# Patient Record
Sex: MALE | Race: BLACK OR AFRICAN AMERICAN | NOT HISPANIC OR LATINO | Employment: FULL TIME | URBAN - METROPOLITAN AREA
[De-identification: names, ages, dates, MRNs, and addresses within clinical notes are randomized per-mention and may not be internally consistent; named-entity substitution may affect disease eponyms.]

---

## 2023-04-06 ENCOUNTER — HOSPITAL ENCOUNTER (EMERGENCY)
Facility: HOSPITAL | Age: 33
Discharge: HOME | End: 2023-04-06
Attending: EMERGENCY MEDICINE
Payer: MEDICAID

## 2023-04-06 VITALS
RESPIRATION RATE: 18 BRPM | SYSTOLIC BLOOD PRESSURE: 183 MMHG | TEMPERATURE: 99 F | WEIGHT: 285 LBS | BODY MASS INDEX: 37.77 KG/M2 | HEART RATE: 105 BPM | DIASTOLIC BLOOD PRESSURE: 95 MMHG | OXYGEN SATURATION: 98 % | HEIGHT: 73 IN

## 2023-04-06 DIAGNOSIS — J02.0 STREP PHARYNGITIS: Primary | ICD-10-CM

## 2023-04-06 LAB — GROUP A STREP, MOLECULAR: POSITIVE

## 2023-04-06 PROCEDURE — 87651 STREP A DNA AMP PROBE: CPT | Performed by: PHYSICIAN ASSISTANT

## 2023-04-06 PROCEDURE — 99284 EMERGENCY DEPT VISIT MOD MDM: CPT

## 2023-04-06 PROCEDURE — 96372 THER/PROPH/DIAG INJ SC/IM: CPT | Performed by: PHYSICIAN ASSISTANT

## 2023-04-06 PROCEDURE — 25000003 PHARM REV CODE 250: Performed by: EMERGENCY MEDICINE

## 2023-04-06 PROCEDURE — 63600175 PHARM REV CODE 636 W HCPCS: Performed by: PHYSICIAN ASSISTANT

## 2023-04-06 RX ORDER — AMOXICILLIN 250 MG/1
500 CAPSULE ORAL
Status: COMPLETED | OUTPATIENT
Start: 2023-04-06 | End: 2023-04-06

## 2023-04-06 RX ORDER — DEXAMETHASONE SODIUM PHOSPHATE 4 MG/ML
8 INJECTION, SOLUTION INTRA-ARTICULAR; INTRALESIONAL; INTRAMUSCULAR; INTRAVENOUS; SOFT TISSUE
Status: COMPLETED | OUTPATIENT
Start: 2023-04-06 | End: 2023-04-06

## 2023-04-06 RX ORDER — AMOXICILLIN 500 MG/1
500 CAPSULE ORAL 3 TIMES DAILY
Qty: 29 CAPSULE | Refills: 0 | Status: SHIPPED | OUTPATIENT
Start: 2023-04-06 | End: 2023-04-16

## 2023-04-06 RX ADMIN — DEXAMETHASONE SODIUM PHOSPHATE 8 MG: 4 INJECTION, SOLUTION INTRA-ARTICULAR; INTRALESIONAL; INTRAMUSCULAR; INTRAVENOUS; SOFT TISSUE at 03:04

## 2023-04-06 RX ADMIN — AMOXICILLIN 500 MG: 250 CAPSULE ORAL at 04:04

## 2023-04-06 NOTE — FIRST PROVIDER EVALUATION
Emergency Department TeleTriage Encounter Note      CHIEF COMPLAINT    Chief Complaint   Patient presents with    Sore Throat     Pt c/o sore throat x a few days. Pt denies cough or nasal congestion. Pt reports painful swallowing but is maintaining his saliva.        VITAL SIGNS   Initial Vitals [04/06/23 1446]   BP Pulse Resp Temp SpO2   (!) 183/95 105 18 98.7 °F (37.1 °C) 98 %      MAP       --            ALLERGIES    Review of patient's allergies indicates:  No Known Allergies    PROVIDER TRIAGE NOTE  Patient presents to the ER with complaint of sore throat starting 2 days ago, he reports painful swallowing.  His symptoms worsened today.  He denies fever.    He used sore throat spray.  No other medications today.    Will order rapid strep test and decadron pending ED provider evaluation.    No evidence of acute distress.     Initial orders will be placed and care will be transferred to an alternate provider when patient is roomed for a full evaluation. Any additional orders and the final disposition will be determined by that provider.         ORDERS  Labs Reviewed - No data to display    ED Orders (720h ago, onward)      None              Virtual Visit Note: The provider triage portion of this emergency department evaluation and documentation was performed via [employer], a HIPAA-compliant telemedicine application, in concert with a tele-presenter in the room. A face to face patient evaluation with one of my colleagues will occur once the patient is placed in an emergency department room.      DISCLAIMER: This note was prepared with [employer] voice recognition transcription software. Garbled syntax, mangled pronouns, and other bizarre constructions may be attributed to that software system.

## 2023-04-06 NOTE — ED PROVIDER NOTES
Encounter Date: 4/6/2023       History     Chief Complaint   Patient presents with    Sore Throat     Pt c/o sore throat x a few days. Pt denies cough or nasal congestion. Pt reports painful swallowing but is maintaining his saliva.      The patient is a 33-year-old male who has had a sore throat for the past 3 days with subjective fevers.  He denies any nausea or vomiting, no cough, no shortness of breath in his a 33-year-old male who presents to the emergency department with sore throat for the past 3 days.  He is had fevers, no nausea or vomiting, no cough, no shortness of breath.    Review of patient's allergies indicates:  No Known Allergies  History reviewed. No pertinent past medical history.  History reviewed. No pertinent surgical history.  History reviewed. No pertinent family history.     Review of Systems   Constitutional:  Positive for appetite change, fatigue and fever.   HENT:  Positive for sore throat.    Respiratory:  Negative for shortness of breath.    Cardiovascular:  Negative for chest pain.   Gastrointestinal:  Negative for nausea.   Genitourinary:  Negative for dysuria.   Musculoskeletal:  Negative for back pain.   Skin:  Negative for rash.   Neurological:  Negative for weakness.   Hematological:  Does not bruise/bleed easily.     Physical Exam     Initial Vitals [04/06/23 1446]   BP Pulse Resp Temp SpO2   (!) 183/95 105 18 98.7 °F (37.1 °C) 98 %      MAP       --         Physical Exam    Nursing note and vitals reviewed.  Constitutional: He appears well-developed and well-nourished.   HENT:   Mouth/Throat: Oropharynx is clear and moist.   Eyes: Pupils are equal, round, and reactive to light.   Neck: Neck supple.   Normal range of motion.  Pulmonary/Chest: Breath sounds normal.   Abdominal: Abdomen is soft. There is no abdominal tenderness.   Musculoskeletal:         General: No edema. Normal range of motion.      Cervical back: Normal range of motion and neck supple.     Neurological: He is  alert and oriented to person, place, and time.   Skin: Skin is warm and dry.       ED Course   Procedures  Labs Reviewed   GROUP A STREP, MOLECULAR - Abnormal; Notable for the following components:       Result Value    Group A Strep, Molecular Positive (*)     All other components within normal limits          Imaging Results    None          Medications   amoxicillin capsule 500 mg (has no administration in time range)   dexAMETHasone injection 8 mg (8 mg Intramuscular Given 4/6/23 9348)     Medical Decision Making:   Initial Assessment:   History provided by the patient and his significant other  Differential Diagnosis:   Strep throat, flu, COVID  ED Management:  Karen has strep throat today.  He will be treated with amoxicillin t.i.d. for 10 days.  He was given a Decadron shot in the emergency department, Bicillin is on national back order patient was instructed to take all of his antibiotics until gone or he could have valvular heart disease.                        Clinical Impression:   Final diagnoses:  [J02.0] Strep pharyngitis (Primary)        ED Disposition Condition    Discharge Stable          ED Prescriptions       Medication Sig Dispense Start Date End Date Auth. Provider    amoxicillin (AMOXIL) 500 MG capsule Take 1 capsule (500 mg total) by mouth 3 (three) times daily. for 10 days 29 capsule 4/6/2023 4/16/2023 Raquel Shin MD          Follow-up Information       Follow up With Specialties Details Why Contact Info Additional Information    [facility] Schedule an appointment as soon as possible for a visit  As needed [address], [address]  [facility] [address] [phone] Please park in [address] or [address] and use [employer]. Take Medical Office Bldg. elevators.             Raquel Shin MD  04/06/23 4308

## 2023-04-26 ENCOUNTER — HOSPITAL ENCOUNTER (INPATIENT)
Facility: HOSPITAL | Age: 33
LOS: 2 days | Discharge: HOME | DRG: 639 | End: 2023-04-28
Attending: EMERGENCY MEDICINE | Admitting: INTERNAL MEDICINE
Payer: MEDICAID

## 2023-04-26 DIAGNOSIS — E11.10 DIABETIC KETOACIDOSIS WITHOUT COMA ASSOCIATED WITH TYPE 2 DIABETES MELLITUS: ICD-10-CM

## 2023-04-26 DIAGNOSIS — E11.9 NEW ONSET TYPE 2 DIABETES MELLITUS: ICD-10-CM

## 2023-04-26 DIAGNOSIS — R06.02 SOB (SHORTNESS OF BREATH): ICD-10-CM

## 2023-04-26 DIAGNOSIS — E10.10 DIABETIC KETOACIDOSIS WITHOUT COMA ASSOCIATED WITH TYPE 1 DIABETES MELLITUS: Primary | ICD-10-CM

## 2023-04-26 DIAGNOSIS — D64.9 NORMOCYTIC ANEMIA: ICD-10-CM

## 2023-04-26 DIAGNOSIS — E11.10 DKA (DIABETIC KETOACIDOSIS): ICD-10-CM

## 2023-04-26 LAB
ALBUMIN SERPL BCP-MCNC: 3.6 G/DL (ref 3.5–5.2)
ALLENS TEST: ABNORMAL
ALP SERPL-CCNC: 108 U/L (ref 55–135)
ALT SERPL W/O P-5'-P-CCNC: 14 U/L (ref 10–44)
ANION GAP SERPL CALC-SCNC: 15 MMOL/L (ref 8–16)
ANION GAP SERPL CALC-SCNC: 17 MMOL/L (ref 8–16)
ANION GAP SERPL CALC-SCNC: 17 MMOL/L (ref 8–16)
AST SERPL-CCNC: 12 U/L (ref 10–40)
B-OH-BUTYR BLD STRIP-SCNC: 4.6 MMOL/L (ref 0–0.5)
BASOPHILS # BLD AUTO: 0.04 K/UL (ref 0–0.2)
BASOPHILS NFR BLD: 0.5 % (ref 0–1.9)
BILIRUB SERPL-MCNC: 0.3 MG/DL (ref 0.1–1)
BNP SERPL-MCNC: <10 PG/ML (ref 0–99)
BUN SERPL-MCNC: 11 MG/DL (ref 6–20)
BUN SERPL-MCNC: 11 MG/DL (ref 6–20)
BUN SERPL-MCNC: 14 MG/DL (ref 6–20)
CALCIUM SERPL-MCNC: 8.6 MG/DL (ref 8.7–10.5)
CALCIUM SERPL-MCNC: 8.8 MG/DL (ref 8.7–10.5)
CALCIUM SERPL-MCNC: 8.9 MG/DL (ref 8.7–10.5)
CHLORIDE SERPL-SCNC: 101 MMOL/L (ref 95–110)
CHLORIDE SERPL-SCNC: 108 MMOL/L (ref 95–110)
CHLORIDE SERPL-SCNC: 108 MMOL/L (ref 95–110)
CHOLEST SERPL-MCNC: 150 MG/DL (ref 120–199)
CHOLEST/HDLC SERPL: 3.8 {RATIO} (ref 2–5)
CO2 SERPL-SCNC: 13 MMOL/L (ref 23–29)
CO2 SERPL-SCNC: 14 MMOL/L (ref 23–29)
CO2 SERPL-SCNC: 14 MMOL/L (ref 23–29)
CREAT SERPL-MCNC: 1.2 MG/DL (ref 0.5–1.4)
CREAT SERPL-MCNC: 1.3 MG/DL (ref 0.5–1.4)
CREAT SERPL-MCNC: 1.4 MG/DL (ref 0.5–1.4)
DELSYS: ABNORMAL
DIFFERENTIAL METHOD: ABNORMAL
EOSINOPHIL # BLD AUTO: 0 K/UL (ref 0–0.5)
EOSINOPHIL NFR BLD: 0.2 % (ref 0–8)
ERYTHROCYTE [DISTWIDTH] IN BLOOD BY AUTOMATED COUNT: 12 % (ref 11.5–14.5)
EST. GFR  (NO RACE VARIABLE): >60 ML/MIN/1.73 M^2
ESTIMATED AVG GLUCOSE: 278 MG/DL (ref 68–131)
GLUCOSE SERPL-MCNC: 334 MG/DL (ref 70–110)
GLUCOSE SERPL-MCNC: 355 MG/DL (ref 70–110)
GLUCOSE SERPL-MCNC: 630 MG/DL (ref 70–110)
HBA1C MFR BLD: 11.3 % (ref 4–5.6)
HCO3 UR-SCNC: 19.6 MMOL/L (ref 24–28)
HCT VFR BLD AUTO: 40.8 % (ref 40–54)
HDLC SERPL-MCNC: 39 MG/DL (ref 40–75)
HDLC SERPL: 26 % (ref 20–50)
HETEROPH AB SERPL QL IA: NEGATIVE
HGB BLD-MCNC: 13.6 G/DL (ref 14–18)
IMM GRANULOCYTES # BLD AUTO: 0.02 K/UL (ref 0–0.04)
IMM GRANULOCYTES NFR BLD AUTO: 0.2 % (ref 0–0.5)
LDLC SERPL CALC-MCNC: 82.2 MG/DL (ref 63–159)
LIPASE SERPL-CCNC: 46 U/L (ref 4–60)
LYMPHOCYTES # BLD AUTO: 1.8 K/UL (ref 1–4.8)
LYMPHOCYTES NFR BLD: 20.9 % (ref 18–48)
MCH RBC QN AUTO: 27.8 PG (ref 27–31)
MCHC RBC AUTO-ENTMCNC: 33.3 G/DL (ref 32–36)
MCV RBC AUTO: 83 FL (ref 82–98)
MODE: ABNORMAL
MONOCYTES # BLD AUTO: 0.3 K/UL (ref 0.3–1)
MONOCYTES NFR BLD: 3.7 % (ref 4–15)
NEUTROPHILS # BLD AUTO: 6.5 K/UL (ref 1.8–7.7)
NEUTROPHILS NFR BLD: 74.5 % (ref 38–73)
NONHDLC SERPL-MCNC: 111 MG/DL
NRBC BLD-RTO: 0 /100 WBC
PCO2 BLDA: 37 MMHG (ref 35–45)
PH SMN: 7.33 [PH] (ref 7.35–7.45)
PLATELET # BLD AUTO: 252 K/UL (ref 150–450)
PMV BLD AUTO: 11.2 FL (ref 9.2–12.9)
PO2 BLDA: 41 MMHG (ref 40–60)
POC BE: -6 MMOL/L
POC SATURATED O2: 72 % (ref 95–100)
POC TCO2: 21 MMOL/L (ref 24–29)
POCT GLUCOSE: 259 MG/DL (ref 70–110)
POCT GLUCOSE: 315 MG/DL (ref 70–110)
POCT GLUCOSE: 328 MG/DL (ref 70–110)
POCT GLUCOSE: 379 MG/DL (ref 70–110)
POCT GLUCOSE: 436 MG/DL (ref 70–110)
POTASSIUM SERPL-SCNC: 3.8 MMOL/L (ref 3.5–5.1)
POTASSIUM SERPL-SCNC: 4 MMOL/L (ref 3.5–5.1)
POTASSIUM SERPL-SCNC: 4.3 MMOL/L (ref 3.5–5.1)
PROT SERPL-MCNC: 8.4 G/DL (ref 6–8.4)
RBC # BLD AUTO: 4.9 M/UL (ref 4.6–6.2)
SAMPLE: ABNORMAL
SITE: ABNORMAL
SODIUM SERPL-SCNC: 132 MMOL/L (ref 136–145)
SODIUM SERPL-SCNC: 137 MMOL/L (ref 136–145)
SODIUM SERPL-SCNC: 138 MMOL/L (ref 136–145)
TRIGL SERPL-MCNC: 144 MG/DL (ref 30–150)
TROPONIN I SERPL DL<=0.01 NG/ML-MCNC: <0.006 NG/ML (ref 0–0.03)
TSH SERPL DL<=0.005 MIU/L-ACNC: 2.86 UIU/ML (ref 0.4–4)
WBC # BLD AUTO: 8.68 K/UL (ref 3.9–12.7)

## 2023-04-26 PROCEDURE — 80053 COMPREHEN METABOLIC PANEL: CPT | Performed by: NURSE PRACTITIONER

## 2023-04-26 PROCEDURE — 84443 ASSAY THYROID STIM HORMONE: CPT | Performed by: STUDENT IN AN ORGANIZED HEALTH CARE EDUCATION/TRAINING PROGRAM

## 2023-04-26 PROCEDURE — 99285 EMERGENCY DEPT VISIT HI MDM: CPT | Mod: 25

## 2023-04-26 PROCEDURE — 25000003 PHARM REV CODE 250: Performed by: NURSE PRACTITIONER

## 2023-04-26 PROCEDURE — 20000000 HC ICU ROOM

## 2023-04-26 PROCEDURE — 80048 BASIC METABOLIC PNL TOTAL CA: CPT | Mod: XB | Performed by: STUDENT IN AN ORGANIZED HEALTH CARE EDUCATION/TRAINING PROGRAM

## 2023-04-26 PROCEDURE — 82010 KETONE BODYS QUAN: CPT | Performed by: NURSE PRACTITIONER

## 2023-04-26 PROCEDURE — 86308 HETEROPHILE ANTIBODY SCREEN: CPT | Performed by: NURSE PRACTITIONER

## 2023-04-26 PROCEDURE — 96374 THER/PROPH/DIAG INJ IV PUSH: CPT

## 2023-04-26 PROCEDURE — 96361 HYDRATE IV INFUSION ADD-ON: CPT

## 2023-04-26 PROCEDURE — 63600175 PHARM REV CODE 636 W HCPCS: Performed by: NURSE PRACTITIONER

## 2023-04-26 PROCEDURE — 25000003 PHARM REV CODE 250: Performed by: STUDENT IN AN ORGANIZED HEALTH CARE EDUCATION/TRAINING PROGRAM

## 2023-04-26 PROCEDURE — 84484 ASSAY OF TROPONIN QUANT: CPT | Performed by: NURSE PRACTITIONER

## 2023-04-26 PROCEDURE — 83690 ASSAY OF LIPASE: CPT | Performed by: STUDENT IN AN ORGANIZED HEALTH CARE EDUCATION/TRAINING PROGRAM

## 2023-04-26 PROCEDURE — 83880 ASSAY OF NATRIURETIC PEPTIDE: CPT | Performed by: NURSE PRACTITIONER

## 2023-04-26 PROCEDURE — 63600175 PHARM REV CODE 636 W HCPCS: Performed by: INTERNAL MEDICINE

## 2023-04-26 PROCEDURE — 93005 ELECTROCARDIOGRAM TRACING: CPT

## 2023-04-26 PROCEDURE — 82962 GLUCOSE BLOOD TEST: CPT

## 2023-04-26 PROCEDURE — 85025 COMPLETE CBC W/AUTO DIFF WBC: CPT | Performed by: NURSE PRACTITIONER

## 2023-04-26 PROCEDURE — 80061 LIPID PANEL: CPT | Performed by: STUDENT IN AN ORGANIZED HEALTH CARE EDUCATION/TRAINING PROGRAM

## 2023-04-26 PROCEDURE — 83036 HEMOGLOBIN GLYCOSYLATED A1C: CPT | Performed by: STUDENT IN AN ORGANIZED HEALTH CARE EDUCATION/TRAINING PROGRAM

## 2023-04-26 PROCEDURE — 93010 EKG 12-LEAD: ICD-10-PCS | Mod: ,,, | Performed by: INTERNAL MEDICINE

## 2023-04-26 PROCEDURE — 63600175 PHARM REV CODE 636 W HCPCS: Performed by: STUDENT IN AN ORGANIZED HEALTH CARE EDUCATION/TRAINING PROGRAM

## 2023-04-26 PROCEDURE — 93010 ELECTROCARDIOGRAM REPORT: CPT | Mod: ,,, | Performed by: INTERNAL MEDICINE

## 2023-04-26 RX ORDER — DEXTROSE 40 %
30 GEL (GRAM) ORAL
Status: DISCONTINUED | OUTPATIENT
Start: 2023-04-26 | End: 2023-04-27 | Stop reason: SDUPTHER

## 2023-04-26 RX ORDER — SODIUM CHLORIDE 9 MG/ML
1000 INJECTION, SOLUTION INTRAVENOUS
Status: DISCONTINUED | OUTPATIENT
Start: 2023-04-26 | End: 2023-04-26

## 2023-04-26 RX ORDER — DEXTROSE MONOHYDRATE 100 MG/ML
INJECTION, SOLUTION INTRAVENOUS
Status: DISCONTINUED | OUTPATIENT
Start: 2023-04-26 | End: 2023-04-27

## 2023-04-26 RX ORDER — GLUCAGON 1 MG
1 KIT INJECTION
Status: DISCONTINUED | OUTPATIENT
Start: 2023-04-26 | End: 2023-04-28 | Stop reason: HOSPADM

## 2023-04-26 RX ORDER — TADALAFIL 5 MG/1
5 TABLET ORAL
COMMUNITY
Start: 2022-12-27 | End: 2023-04-26

## 2023-04-26 RX ORDER — NALOXONE HCL 0.4 MG/ML
0.02 VIAL (ML) INJECTION
Status: DISCONTINUED | OUTPATIENT
Start: 2023-04-26 | End: 2023-04-28 | Stop reason: HOSPADM

## 2023-04-26 RX ORDER — SODIUM CHLORIDE 0.9 % (FLUSH) 0.9 %
10 SYRINGE (ML) INJECTION EVERY 12 HOURS PRN
Status: DISCONTINUED | OUTPATIENT
Start: 2023-04-26 | End: 2023-04-28 | Stop reason: HOSPADM

## 2023-04-26 RX ORDER — ACETAMINOPHEN 500 MG
500 TABLET ORAL EVERY 6 HOURS PRN
Status: ON HOLD | COMMUNITY
End: 2023-04-28 | Stop reason: HOSPADM

## 2023-04-26 RX ORDER — ENOXAPARIN SODIUM 100 MG/ML
40 INJECTION SUBCUTANEOUS EVERY 24 HOURS
Status: DISCONTINUED | OUTPATIENT
Start: 2023-04-26 | End: 2023-04-28 | Stop reason: HOSPADM

## 2023-04-26 RX ORDER — DEXTROSE 40 %
15 GEL (GRAM) ORAL
Status: DISCONTINUED | OUTPATIENT
Start: 2023-04-26 | End: 2023-04-27 | Stop reason: SDUPTHER

## 2023-04-26 RX ADMIN — SODIUM CHLORIDE 1000 ML: 0.9 INJECTION, SOLUTION INTRAVENOUS at 05:04

## 2023-04-26 RX ADMIN — SODIUM CHLORIDE 0.1 UNITS/KG/HR: 9 INJECTION, SOLUTION INTRAVENOUS at 09:04

## 2023-04-26 RX ADMIN — SODIUM CHLORIDE 1000 ML: 0.9 INJECTION, SOLUTION INTRAVENOUS at 06:04

## 2023-04-26 RX ADMIN — INSULIN HUMAN 6 UNITS: 100 INJECTION, SOLUTION PARENTERAL at 06:04

## 2023-04-26 RX ADMIN — ENOXAPARIN SODIUM 40 MG: 40 INJECTION SUBCUTANEOUS at 09:04

## 2023-04-26 RX ADMIN — INSULIN DETEMIR 10 UNITS: 100 INJECTION, SOLUTION SUBCUTANEOUS at 10:04

## 2023-04-26 RX ADMIN — POTASSIUM CHLORIDE AND SODIUM CHLORIDE 250 ML/HR: 450; 150 INJECTION, SOLUTION INTRAVENOUS at 10:04

## 2023-04-26 NOTE — Clinical Note
Diagnosis: DKA (diabetic ketoacidosis) [391747]   Admitting Provider:: RAUL HECTOR [[id number]]   Future Attending Provider: RAUL HECTOR [[id number]]   Reason for IP Medical Treatment  (Clinical interventions that can only be accomplished in the IP setting? ) :: DKA   I certify that Inpatient services for greater than or equal to 2 midnights are medically necessary:: Yes   Plans for Post-Acute care--if anticipated (pick the single best option):: A. No post acute care anticipated at this time   Special Needs:: No Special Needs [1]

## 2023-04-26 NOTE — ED TRIAGE NOTES
Review of patient's allergies indicates:  No Known Allergies  Chief Complaint   Patient presents with    Shortness of Breath     Reports sob/fatigue since being diagnosed/treated for strep throat 2 weeks ago.      No past medical history on file.    Patient identifiers verified and correct.     LOC: The patient is awake, alert and aware of environment with an appropriate affect, the patient is oriented x 3 and speaking appropriately.     APPEARANCE: Patient appears comfortable and in no acute distress, patient is clean and well groomed.    HEENT: Head symmetrical. Eyes bilateral.  Bilateral ears without drainage. Bilateral nares patent, throat clear.    SKIN: The skin is warm and dry, color consistent with ethnicity, patient has normal skin turgor and moist mucus membranes, skin intact, no breakdown or bruising noted.     MUSCULOSKELETAL: Patient moving all extremities spontaneously, no swelling noted.    RESPIRATORY: Airway is open and patent, respirations are spontaneous, patient has a normal effort and rate, no accessory muscle use noted.     CARDIAC: Patient has a normal rate and regular rhythm, no edema noted, capillary refill < 3 seconds.     GASTRO: Abdomen soft and non-distended.     NEURO: Pt opens eyes spontaneously pupils equal, round, and reactive. behavior appropriate to situation, follows commands, facial expression symmetrical,    NEUROVASCULAR: All extremities are warm and pink.       Will continue to monitor.

## 2023-04-26 NOTE — LETTER
April 28, 2023         [address] [place] [address] [facility] [facility] [place] [address]  Phone: [phone]  Fax: [phone]       Patient: Jonh Figueroa   YOB: 1990  Date of Visit: 04/28/2023    To Whom It May Concern:    Yeny Figueroa  was at [facility] on 4/26/2023. The patient may return to work/school on 5/2/2023 with no restrictions. If you have any questions or concerns, or if I can be of further assistance, please do not hesitate to contact me.    Sincerely,    Tasia Valenzuela MD

## 2023-04-26 NOTE — ED PROVIDER NOTES
"Encounter Date: 4/26/2023       History     Chief Complaint   Patient presents with    Shortness of Breath     Reports sob/fatigue since being diagnosed/treated for strep throat 2 weeks ago.      33 yr old male presents to the ER with reports of fatigue and sob. Pt states he completed antbx for strep a week ago, started a new job this Monday however missed work this morning due to fatigue. Denies chest pain. No fever, rash, neck stiffness, nausea, vomiting or diarrhea. Pt states "im drinking 3 gallons of water a day with no issues but I still feel like I need more water". Denies hemoptysis. No recent travel. No PMH reported.     The history is provided by the patient. No  was used.   Review of patient's allergies indicates:  No Known Allergies  No past medical history on file.  No past surgical history on file.  No family history on file.     Review of Systems   Constitutional:  Positive for fatigue.   Respiratory:  Positive for shortness of breath.    Endocrine: Positive for polydipsia.   All other systems reviewed and are negative.    Physical Exam     Initial Vitals [04/26/23 1523]   BP Pulse Resp Temp SpO2   137/89 102 20 97.9 °F (36.6 °C) 99 %      MAP       --         Physical Exam    Constitutional: He appears well-developed and well-nourished. He is not diaphoretic. He is Obese . No distress.   HENT:   Head: Normocephalic and atraumatic.   Right Ear: Hearing and tympanic membrane normal.   Left Ear: Hearing and tympanic membrane normal.   Nose: Nose normal.   Mouth/Throat: Uvula is midline, oropharynx is clear and moist and mucous membranes are normal.   Eyes: Lids are normal. Pupils are equal, round, and reactive to light.   Cardiovascular:  Normal rate.           Pulmonary/Chest: Effort normal and breath sounds normal. No respiratory distress. He has no wheezes. He has no rhonchi.   Abdominal: Abdomen is soft. There is no abdominal tenderness.   Musculoskeletal:         General: Normal " range of motion.      Cervical back: No rigidity.     Neurological: He is oriented to person, place, and time.   Skin: Skin is warm and dry. No rash noted.   Psychiatric: He has a normal mood and affect. His behavior is normal. Judgment and thought content normal.       ED Course   Critical Care    Date/Time: 4/26/2023 6:28 PM  Performed by: Marguerite Murillo NP  Authorized by: Maru Schafer MD   Direct patient critical care time: 30 minutes  Total critical care time (exclusive of procedural time) : 30 minutes  Critical care was necessary to treat or prevent imminent or life-threatening deterioration of the following conditions: metabolic crisis.  Critical care was time spent personally by me on the following activities: blood draw for specimens, discussions with consultants, evaluation of patient's response to treatment, examination of patient, obtaining history from patient or surrogate, ordering and performing treatments and interventions, ordering and review of laboratory studies, pulse oximetry, re-evaluation of patient's condition and review of old charts.      Labs Reviewed   CBC W/ AUTO DIFFERENTIAL - Abnormal; Notable for the following components:       Result Value    Hemoglobin 13.6 (*)     Gran % 74.5 (*)     Mono % 3.7 (*)     All other components within normal limits   COMPREHENSIVE METABOLIC PANEL - Abnormal; Notable for the following components:    Sodium 132 (*)     CO2 14 (*)     Glucose 630 (*)     Anion Gap 17 (*)     All other components within normal limits    Narrative:       GLU critical result(s) called and verbal readback obtained from   Nusrat Jack RN by EDGARDO 04/26/2023 17:01   BETA - HYDROXYBUTYRATE, SERUM - Abnormal; Notable for the following components:    Beta-Hydroxybutyrate 4.6 (*)     All other components within normal limits   ISTAT PROCEDURE - Abnormal; Notable for the following components:    POC PH 7.331 (*)     POC HCO3 19.6 (*)     POC SATURATED O2 72 (*)     POC TCO2  21 (*)     All other components within normal limits   POCT GLUCOSE - Abnormal; Notable for the following components:    POCT Glucose 436 (*)     All other components within normal limits   HETEROPHILE AB SCREEN   TROPONIN I   B-TYPE NATRIURETIC PEPTIDE   POCT GLUCOSE MONITORING CONTINUOUS          Imaging Results              X-Ray Chest PA And Lateral (Final result)  Result time 04/26/23 16:33:58      Final result by Lauren Downing MD (04/26/23 16:33:58)                   Impression:      No significant abnormality seen.      Electronically signed by: Lauren Downing MD  Date:    04/26/2023  Time:    16:33               Narrative:    EXAMINATION:  XR CHEST PA AND LATERAL    TECHNIQUE:  PA and lateral views of the chest were performed.    COMPARISON:  None    FINDINGS:  The cardiac silhouette is normal in size, midline.    The pulmonary vascularity is normal.    The pulmonary waqas appear normal bilaterally.    The lungs are well expanded and clear.    No focal airspace disease.    No pleural effusion, no pneumothorax.    The osseous structures appear within normal limits for age.                                       Medications   insulin regular injection 6 Units 0.06 mL (has no administration in time range)   sodium chloride 0.9% bolus 1,000 mL 1,000 mL (has no administration in time range)   sodium chloride 0.9% bolus 1,000 mL 1,000 mL (0 mLs Intravenous Stopped 4/26/23 1822)     Medical Decision Making:   Differential Diagnosis:   Differential Diagnosis includes, but is not limited to:  CVA/TIA, vertigo, anemia/blood loss, cardiogenic shock, arrhythmia, orthostatic hypotension, dehydration, medication side effect, vitamin deficiency, liver disease, hypothyroidism, drug intoxication/withdrawal, metabolic derangement.   Clinical Tests:   Lab Tests: Ordered           ED Course as of 04/26/23 1828 Wed Apr 26, 2023   1649 FINDINGS:  The cardiac silhouette is normal in size, midline.     The pulmonary  vascularity is normal.     The pulmonary waqas appear normal bilaterally.     The lungs are well expanded and clear.     No focal airspace disease.     No pleural effusion, no pneumothorax.     The osseous structures appear within normal limits for age.     Impression:     No significant abnormality seen.    [DT]   1709 Patient's glucose is noted to be 630.  This is a new onset diabetes.  The patient will be given IV fluids and moved to a room for possible admission.  A VBG and beta hydroxy were also added at this time. [DT]   1827 Spoke with [facility] concerning admission.  The patient's glucose did improve with 1 L fluids from 630-436.  The patient will be given IV 6 units of regular insulin in addition to 1 additional bolus of fluids.  The admit team will come down to evaluate the patient prior to starting an IV drip if needed.  He CO2 was noted to be 14 with a gap of 17.  The venous blood gas with a pH of 7.331 was also noted.  A beta hydroxy of 4.6 has been resulted also.  Again this is a first-time diagnoses of diabetes and the patient will be admitted for continued monitoring. [DT]      ED Course User Index  [DT] Marguerite Murillo NP                   Clinical Impression:   Final diagnoses:  [R06.02] SOB (shortness of breath)  [E11.9] New onset type 2 diabetes mellitus (Primary)        ED Disposition Condition    Observation Stable                Marguerite Murillo NP  04/26/23 1828

## 2023-04-27 LAB
ALBUMIN SERPL BCP-MCNC: 2.9 G/DL (ref 3.5–5.2)
ALP SERPL-CCNC: 70 U/L (ref 55–135)
ALT SERPL W/O P-5'-P-CCNC: 11 U/L (ref 10–44)
ANION GAP SERPL CALC-SCNC: 10 MMOL/L (ref 8–16)
ANION GAP SERPL CALC-SCNC: 11 MMOL/L (ref 8–16)
ANION GAP SERPL CALC-SCNC: 13 MMOL/L (ref 8–16)
ANION GAP SERPL CALC-SCNC: 9 MMOL/L (ref 8–16)
ANION GAP SERPL CALC-SCNC: NORMAL MMOL/L (ref 8–16)
AST SERPL-CCNC: 10 U/L (ref 10–40)
BASOPHILS # BLD AUTO: 0.03 K/UL (ref 0–0.2)
BASOPHILS NFR BLD: 0.3 % (ref 0–1.9)
BILIRUB SERPL-MCNC: 0.5 MG/DL (ref 0.1–1)
BUN SERPL-MCNC: 10 MG/DL (ref 6–20)
BUN SERPL-MCNC: 8 MG/DL (ref 6–20)
BUN SERPL-MCNC: 8 MG/DL (ref 6–20)
BUN SERPL-MCNC: 9 MG/DL (ref 6–20)
BUN SERPL-MCNC: NORMAL MG/DL (ref 6–20)
CALCIUM SERPL-MCNC: 8.3 MG/DL (ref 8.7–10.5)
CALCIUM SERPL-MCNC: 8.6 MG/DL (ref 8.7–10.5)
CALCIUM SERPL-MCNC: 8.7 MG/DL (ref 8.7–10.5)
CALCIUM SERPL-MCNC: 8.8 MG/DL (ref 8.7–10.5)
CALCIUM SERPL-MCNC: 8.9 MG/DL (ref 8.7–10.5)
CALCIUM SERPL-MCNC: NORMAL MG/DL (ref 8.7–10.5)
CHLORIDE SERPL-SCNC: 106 MMOL/L (ref 95–110)
CHLORIDE SERPL-SCNC: 107 MMOL/L (ref 95–110)
CHLORIDE SERPL-SCNC: 110 MMOL/L (ref 95–110)
CHLORIDE SERPL-SCNC: 114 MMOL/L (ref 95–110)
CHLORIDE SERPL-SCNC: NORMAL MMOL/L (ref 95–110)
CO2 SERPL-SCNC: 17 MMOL/L (ref 23–29)
CO2 SERPL-SCNC: 18 MMOL/L (ref 23–29)
CO2 SERPL-SCNC: 18 MMOL/L (ref 23–29)
CO2 SERPL-SCNC: NORMAL MMOL/L (ref 23–29)
CREAT SERPL-MCNC: 1 MG/DL (ref 0.5–1.4)
CREAT SERPL-MCNC: 1.1 MG/DL (ref 0.5–1.4)
CREAT SERPL-MCNC: 1.1 MG/DL (ref 0.5–1.4)
CREAT SERPL-MCNC: NORMAL MG/DL (ref 0.5–1.4)
DIFFERENTIAL METHOD: ABNORMAL
EOSINOPHIL # BLD AUTO: 0 K/UL (ref 0–0.5)
EOSINOPHIL NFR BLD: 0.2 % (ref 0–8)
ERYTHROCYTE [DISTWIDTH] IN BLOOD BY AUTOMATED COUNT: 12 % (ref 11.5–14.5)
EST. GFR  (AFRICAN AMERICAN): NORMAL ML/MIN/1.73 M^2
EST. GFR  (NO RACE VARIABLE): >60 ML/MIN/1.73 M^2
EST. GFR  (NO RACE VARIABLE): NORMAL ML/MIN/1.73 M^2
EST. GFR  (NON AFRICAN AMERICAN): NORMAL ML/MIN/1.73 M^2
GLUCOSE SERPL-MCNC: 111 MG/DL (ref 70–110)
GLUCOSE SERPL-MCNC: 272 MG/DL (ref 70–110)
GLUCOSE SERPL-MCNC: 323 MG/DL (ref 70–110)
GLUCOSE SERPL-MCNC: 344 MG/DL (ref 70–110)
GLUCOSE SERPL-MCNC: 67 MG/DL (ref 70–110)
GLUCOSE SERPL-MCNC: NORMAL MG/DL (ref 70–110)
HCT VFR BLD AUTO: 34.1 % (ref 40–54)
HGB BLD-MCNC: 11.3 G/DL (ref 14–18)
IMM GRANULOCYTES # BLD AUTO: 0.03 K/UL (ref 0–0.04)
IMM GRANULOCYTES NFR BLD AUTO: 0.3 % (ref 0–0.5)
LYMPHOCYTES # BLD AUTO: 2.4 K/UL (ref 1–4.8)
LYMPHOCYTES NFR BLD: 24.4 % (ref 18–48)
MAGNESIUM SERPL-MCNC: 2 MG/DL (ref 1.6–2.6)
MCH RBC QN AUTO: 27.7 PG (ref 27–31)
MCHC RBC AUTO-ENTMCNC: 33.1 G/DL (ref 32–36)
MCV RBC AUTO: 84 FL (ref 82–98)
MONOCYTES # BLD AUTO: 0.7 K/UL (ref 0.3–1)
MONOCYTES NFR BLD: 7 % (ref 4–15)
NEUTROPHILS # BLD AUTO: 6.8 K/UL (ref 1.8–7.7)
NEUTROPHILS NFR BLD: 67.8 % (ref 38–73)
NRBC BLD-RTO: 0 /100 WBC
PHOSPHATE SERPL-MCNC: 3.6 MG/DL (ref 2.7–4.5)
PLATELET # BLD AUTO: 217 K/UL (ref 150–450)
PMV BLD AUTO: 11.3 FL (ref 9.2–12.9)
POCT GLUCOSE: 131 MG/DL (ref 70–110)
POCT GLUCOSE: 256 MG/DL (ref 70–110)
POCT GLUCOSE: 280 MG/DL (ref 70–110)
POCT GLUCOSE: 297 MG/DL (ref 70–110)
POCT GLUCOSE: 324 MG/DL (ref 70–110)
POCT GLUCOSE: 327 MG/DL (ref 70–110)
POCT GLUCOSE: 332 MG/DL (ref 70–110)
POCT GLUCOSE: 366 MG/DL (ref 70–110)
POCT GLUCOSE: 465 MG/DL (ref 70–110)
POCT GLUCOSE: 61 MG/DL (ref 70–110)
POTASSIUM SERPL-SCNC: 3.1 MMOL/L (ref 3.5–5.1)
POTASSIUM SERPL-SCNC: 3.2 MMOL/L (ref 3.5–5.1)
POTASSIUM SERPL-SCNC: 3.7 MMOL/L (ref 3.5–5.1)
POTASSIUM SERPL-SCNC: 3.8 MMOL/L (ref 3.5–5.1)
POTASSIUM SERPL-SCNC: 3.9 MMOL/L (ref 3.5–5.1)
POTASSIUM SERPL-SCNC: NORMAL MMOL/L (ref 3.5–5.1)
PROT SERPL-MCNC: 6.8 G/DL (ref 6–8.4)
RBC # BLD AUTO: 4.08 M/UL (ref 4.6–6.2)
SODIUM SERPL-SCNC: 134 MMOL/L (ref 136–145)
SODIUM SERPL-SCNC: 135 MMOL/L (ref 136–145)
SODIUM SERPL-SCNC: 140 MMOL/L (ref 136–145)
SODIUM SERPL-SCNC: 141 MMOL/L (ref 136–145)
SODIUM SERPL-SCNC: NORMAL MMOL/L (ref 136–145)
WBC # BLD AUTO: 10.02 K/UL (ref 3.9–12.7)

## 2023-04-27 PROCEDURE — 80048 BASIC METABOLIC PNL TOTAL CA: CPT | Mod: XB | Performed by: INTERNAL MEDICINE

## 2023-04-27 PROCEDURE — 86337 INSULIN ANTIBODIES: CPT | Performed by: STUDENT IN AN ORGANIZED HEALTH CARE EDUCATION/TRAINING PROGRAM

## 2023-04-27 PROCEDURE — 25000003 PHARM REV CODE 250: Performed by: STUDENT IN AN ORGANIZED HEALTH CARE EDUCATION/TRAINING PROGRAM

## 2023-04-27 PROCEDURE — 63600175 PHARM REV CODE 636 W HCPCS: Performed by: STUDENT IN AN ORGANIZED HEALTH CARE EDUCATION/TRAINING PROGRAM

## 2023-04-27 PROCEDURE — 80053 COMPREHEN METABOLIC PANEL: CPT | Performed by: STUDENT IN AN ORGANIZED HEALTH CARE EDUCATION/TRAINING PROGRAM

## 2023-04-27 PROCEDURE — 94761 N-INVAS EAR/PLS OXIMETRY MLT: CPT

## 2023-04-27 PROCEDURE — 36415 COLL VENOUS BLD VENIPUNCTURE: CPT | Performed by: INTERNAL MEDICINE

## 2023-04-27 PROCEDURE — 85025 COMPLETE CBC W/AUTO DIFF WBC: CPT | Performed by: STUDENT IN AN ORGANIZED HEALTH CARE EDUCATION/TRAINING PROGRAM

## 2023-04-27 PROCEDURE — 86341 ISLET CELL ANTIBODY: CPT | Performed by: STUDENT IN AN ORGANIZED HEALTH CARE EDUCATION/TRAINING PROGRAM

## 2023-04-27 PROCEDURE — 25000003 PHARM REV CODE 250: Performed by: INTERNAL MEDICINE

## 2023-04-27 PROCEDURE — 63600175 PHARM REV CODE 636 W HCPCS: Performed by: INTERNAL MEDICINE

## 2023-04-27 PROCEDURE — 11000001 HC ACUTE MED/SURG PRIVATE ROOM

## 2023-04-27 PROCEDURE — 36415 COLL VENOUS BLD VENIPUNCTURE: CPT | Performed by: STUDENT IN AN ORGANIZED HEALTH CARE EDUCATION/TRAINING PROGRAM

## 2023-04-27 PROCEDURE — 80048 BASIC METABOLIC PNL TOTAL CA: CPT | Mod: 91,XB | Performed by: STUDENT IN AN ORGANIZED HEALTH CARE EDUCATION/TRAINING PROGRAM

## 2023-04-27 PROCEDURE — 84100 ASSAY OF PHOSPHORUS: CPT | Performed by: STUDENT IN AN ORGANIZED HEALTH CARE EDUCATION/TRAINING PROGRAM

## 2023-04-27 PROCEDURE — 83735 ASSAY OF MAGNESIUM: CPT | Performed by: STUDENT IN AN ORGANIZED HEALTH CARE EDUCATION/TRAINING PROGRAM

## 2023-04-27 RX ORDER — INSULIN ASPART 100 [IU]/ML
1-10 INJECTION, SOLUTION INTRAVENOUS; SUBCUTANEOUS
Status: DISCONTINUED | OUTPATIENT
Start: 2023-04-27 | End: 2023-04-28 | Stop reason: HOSPADM

## 2023-04-27 RX ORDER — MUPIROCIN 20 MG/G
OINTMENT TOPICAL 2 TIMES DAILY
Status: DISCONTINUED | OUTPATIENT
Start: 2023-04-27 | End: 2023-04-28 | Stop reason: HOSPADM

## 2023-04-27 RX ORDER — MAGNESIUM SULFATE HEPTAHYDRATE 40 MG/ML
2 INJECTION, SOLUTION INTRAVENOUS ONCE
Status: COMPLETED | OUTPATIENT
Start: 2023-04-27 | End: 2023-04-27

## 2023-04-27 RX ORDER — DEXTROSE 40 %
30 GEL (GRAM) ORAL
Status: DISCONTINUED | OUTPATIENT
Start: 2023-04-27 | End: 2023-04-28 | Stop reason: HOSPADM

## 2023-04-27 RX ORDER — GLUCAGON 1 MG
1 KIT INJECTION
Status: DISCONTINUED | OUTPATIENT
Start: 2023-04-27 | End: 2023-04-28 | Stop reason: HOSPADM

## 2023-04-27 RX ORDER — DEXTROSE 40 %
15 GEL (GRAM) ORAL
Status: DISCONTINUED | OUTPATIENT
Start: 2023-04-27 | End: 2023-04-28 | Stop reason: HOSPADM

## 2023-04-27 RX ORDER — DEXTROSE MONOHYDRATE, SODIUM CHLORIDE, AND POTASSIUM CHLORIDE 50; 2.98; 4.5 G/1000ML; G/1000ML; G/1000ML
INJECTION, SOLUTION INTRAVENOUS CONTINUOUS
Status: ACTIVE | OUTPATIENT
Start: 2023-04-27 | End: 2023-04-27

## 2023-04-27 RX ORDER — POTASSIUM CHLORIDE 20 MEQ/1
40 TABLET, EXTENDED RELEASE ORAL ONCE
Status: COMPLETED | OUTPATIENT
Start: 2023-04-27 | End: 2023-04-27

## 2023-04-27 RX ORDER — DEXTROSE MONOHYDRATE, SODIUM CHLORIDE, AND POTASSIUM CHLORIDE 50; 2.98; 4.5 G/1000ML; G/1000ML; G/1000ML
INJECTION, SOLUTION INTRAVENOUS CONTINUOUS
Status: DISCONTINUED | OUTPATIENT
Start: 2023-04-27 | End: 2023-04-27

## 2023-04-27 RX ADMIN — INSULIN DETEMIR 10 UNITS: 100 INJECTION, SOLUTION SUBCUTANEOUS at 08:04

## 2023-04-27 RX ADMIN — MUPIROCIN: 20 OINTMENT TOPICAL at 08:04

## 2023-04-27 RX ADMIN — INSULIN ASPART 8 UNITS: 100 INJECTION, SOLUTION INTRAVENOUS; SUBCUTANEOUS at 04:04

## 2023-04-27 RX ADMIN — INSULIN ASPART 8 UNITS: 100 INJECTION, SOLUTION INTRAVENOUS; SUBCUTANEOUS at 05:04

## 2023-04-27 RX ADMIN — DEXTROSE MONOHYDRATE, SODIUM CHLORIDE, AND POTASSIUM CHLORIDE: 50; 4.5; 2.98 INJECTION, SOLUTION INTRAVENOUS at 02:04

## 2023-04-27 RX ADMIN — INSULIN ASPART 6 UNITS: 100 INJECTION, SOLUTION INTRAVENOUS; SUBCUTANEOUS at 09:04

## 2023-04-27 RX ADMIN — MUPIROCIN: 20 OINTMENT TOPICAL at 09:04

## 2023-04-27 RX ADMIN — MAGNESIUM SULFATE 2 G: 2 INJECTION INTRAVENOUS at 03:04

## 2023-04-27 RX ADMIN — POTASSIUM CHLORIDE 40 MEQ: 1500 TABLET, EXTENDED RELEASE ORAL at 03:04

## 2023-04-27 RX ADMIN — INSULIN ASPART 5 UNITS: 100 INJECTION, SOLUTION INTRAVENOUS; SUBCUTANEOUS at 08:04

## 2023-04-27 RX ADMIN — DEXTROSE MONOHYDRATE 125 ML: 100 INJECTION, SOLUTION INTRAVENOUS at 02:04

## 2023-04-27 NOTE — PROGRESS NOTES
" [facility] Medicine Progress Note    Primary Team: hospitals Hospitalist Team B  Attending Physician: Alvaro Anthony MD  Resident: Brian  Intern: [facility] Day: 1 days    Chief Complaint: Weakness    Subjective:      Patient seen and examined by me this morning.  No focal complaints, states he is hungry and ready for breakfast. Denies any SOB, chestpain, nausea, or vomiting.    Review of Systems   Constitutional:  Negative for chills and fever.   Eyes:  Negative for blurred vision.   Respiratory:  Negative for cough.    Cardiovascular:  Negative for chest pain.   Gastrointestinal:  Negative for nausea and vomiting.   Genitourinary:  Negative for dysuria.   Neurological:  Negative for dizziness and headaches.       Objective:   Last 24 Hour Vital Signs:  BP  Min: 114/68  Max: 172/88  Temp  Av °F (36.7 °C)  Min: 97.9 °F (36.6 °C)  Max: 98 °F (36.7 °C)  Pulse  Av.5  Min: 84  Max: 102  Resp  Av  Min: 4  Max: 37  SpO2  Av.8 %  Min: 97 %  Max: 100 %  Height  Av' 1" (185.4 cm)  Min: 6' 1" (185.4 cm)  Max: 6' 1" (185.4 cm)  Weight  Av kg (260 lb 1.1 oz)  Min: 117.9 kg (260 lb)  Max: 118 kg (260 lb 2.3 oz)    Intake/Output Summary (Last 24 hours) at 2023 0818  Last data filed at 2023 0337  Gross per 24 hour   Intake 2718.17 ml   Output --   Net 2718.17 ml      Physical Examination:  Physical Exam  Constitutional:       Appearance: He is normal weight.   HENT:      Head: Normocephalic and atraumatic.      Right Ear: External ear normal.      Left Ear: External ear normal.      Nose: Nose normal.      Mouth/Throat:      Mouth: Mucous membranes are moist.   Eyes:      Extraocular Movements: Extraocular movements intact.      Conjunctiva/sclera: Conjunctivae normal.   Cardiovascular:      Rate and Rhythm: Normal rate and regular rhythm.      Heart sounds: No murmur heard.  Pulmonary:      Effort: Pulmonary effort is normal.      Breath sounds: Normal breath sounds. No stridor. "   Abdominal:      General: Abdomen is flat. Bowel sounds are normal.      Palpations: Abdomen is soft.   Musculoskeletal:         General: Normal range of motion.   Skin:     General: Skin is warm and dry.      Coloration: Skin is not jaundiced.   Neurological:      Mental Status: He is alert and oriented to person, place, and time. Mental status is at baseline.   Psychiatric:         Mood and Affect: Mood normal.      Laboratory:  Recent Labs   Lab 04/26/23  1611 04/26/23  2019 04/27/23  0048 04/27/23  0256 04/27/23  0439   WBC 8.68  --   --   --  10.02   HGB 13.6*  --   --   --  11.3*   HCT 40.8  --   --   --  34.1*     --   --   --  217   MCV 83  --   --   --  84   RDW 12.0  --   --   --  12.0   *   < > 141 140 135*  135*  135*   K 4.3   < > 3.2* 3.1* 3.7  3.7  3.7      < > 114* 110 107  107  107   CO2 14*   < > 18* 17* 17*  17*  17*   BUN 14   < > 10 9 9  9  9   CREATININE 1.4   < > 1.0 1.0 1.0  1.0  1.0   *   < > 111* 67* 323*  323*  323*   PROT 8.4  --   --   --  6.8   ALBUMIN 3.6  --   --   --  2.9*   BILITOT 0.3  --   --   --  0.5   AST 12  --   --   --  10   ALKPHOS 108  --   --   --  70   ALT 14  --   --   --  11    < > = values in this interval not displayed.       Microbiology Results (last 7 days)       ** No results found for the last 168 hours. **             I have personally reviewed the above laboratory studies.     Imaging:  X-Ray Chest PA And Lateral    Result Date: 4/26/2023  EXAMINATION: XR CHEST PA AND LATERAL TECHNIQUE: PA and lateral views of the chest were performed. COMPARISON: None FINDINGS: The cardiac silhouette is normal in size, midline. The pulmonary vascularity is normal. The pulmonary waqas appear normal bilaterally. The lungs are well expanded and clear. No focal airspace disease. No pleural effusion, no pneumothorax. The osseous structures appear within normal limits for age.     No significant abnormality seen. Electronically signed  by: Lauren Downing MD Date:    04/26/2023 Time:    16:33     Current Medications:     Scheduled:   enoxaparin  40 mg Subcutaneous Daily    insulin detemir U-100  10 Units Subcutaneous QHS    mupirocin   Nasal BID         Infusions:       PRN:  dextrose 10%, dextrose 10%, dextrose, dextrose, glucagon (human recombinant), glucagon (human recombinant), insulin aspart U-100, naloxone, sodium chloride 0.9%  Antibiotics and Day Number of Therapy:  Antibiotics (From admission, onward)      Start     Stop Route Frequency Ordered    04/27/23 0900  mupirocin 2 % ointment         05/02 0859 Nasl 2 times daily 04/27/23 0244                Assessment:     Jonh Figueroa is a 33 y.o. male with no past medical history who presents with DKA    Plan:     DKA, resolved  Newly Diagnosed Diabetes.  -GAP closed, patient eating, off insulin drip since this morning  -Continue Glucose checks, SSI, and 10U Detemir QN  -Plan to monitor morning glucose with detemir to determine discharge dose.  -Consulted diabetes education and dietician  -A1c 11.3, insulin and GADA antibodies ordered to evaluate for T1DM     Health Care Maintenance  - Vaccinations needed: covid, flu, tdap, prevnar 20     Ppx: lovenox  Diet: Diabetic  Code: FULL    Dispo: Transfer to floor    Brayan Torers MD  [facility] Internal Medicine PGY-2    [facility] Medicine Hospitalist Pager numbers:   [facility] Hospitalist Medicine Team A (Donna/Cordell): [phone]  [facility] Hospitalist Medicine Team B (Tiana/Gabrielle):  [phone]

## 2023-04-27 NOTE — PLAN OF CARE
Pt started on diabetic teaching, let him draw up and give insulin, do finger stick, reading material given

## 2023-04-27 NOTE — PLAN OF CARE
Pt off fluids and insulin drip, pt has no co except sore throat, had been recently treated for strept throat, can not see any redness, using choraseptic spray, started some diabetic instructions, will speak with  re getting glucometer, and getting outpatient, diabetic instructions

## 2023-04-27 NOTE — PROGRESS NOTES
The sw was just notified by Harleen with [facility] that the pt has been approved for Medicaid. The sw notified the pt of this info.

## 2023-04-27 NOTE — CONSULTS
Food & Nutrition  Education    Diet Education: Diabetes  Time Spent: N/A  Learners: Pt      Nutrition Education provided with handouts: (Attached to d/c Papers)  Carbohydrate Counting  Diabetes and Diet    Comments:  Pt was having ultrasound at times of attempted visits. Attached handouts to pt's d/c paperwork. Will follow up Monday for possible eduction.      All questions and concerns answered. Dietitian's contact information provided.       Follow-Up: 5/1/2023    Please Re-consult as needed        Thanks!

## 2023-04-27 NOTE — PLAN OF CARE
Pt 's mom at bedside, informed her of son's status, she is on insulin, so she will be able to help him with the insulin, told her dietician to see him, she reviewed the information pamphlets I gave him, also told her and pt , that since now on medicaid, he should go to a diabetes class at the [facility], it would highly benefit him, numbers on the info given    Report to fanny HAMMER

## 2023-04-27 NOTE — EICU
e-ICU admit note            Reason for ICU admission:    DKA      HPI:    32 yo male with lightheadedness.     3 weeks ago was tx for  strep throat.    Now found to have glucose 630, AG 17, beta hydroxy 4.6    Admitted for DKA    Not known as diabetic    In ED received 1 L NS and 6 u regular insulin                         Significant images    CXR normal    ECG:    NSR, q inferior                        I viewed the pt via camera at    10:42 pm:    99 sinus, 100%, 150/90, R 20     Talking on phone     NAD                                         Assessment/Plan:    DKA  Protocol for insulin, lytes, fluids    Recent strep throat  treated                 DVT ppx  enoxaparin

## 2023-04-27 NOTE — H&P
[facility] Medicine H&P Note     Admitting Team: [facility] Hospitalist Team B  Attending Physician: Alvaro Anthony MD  Resident: Brian  Intern: Aleida    Date of Admit: 4/26/2023    Chief Complaint     Lightheadedness for 2 days     Subjective:      History of Present Illness:  Jonh Figueroa is a 33 y.o. male with no previous PMHx. The patient presented on 4/26/2023 for evaluation of lightheadedness.    Patient was in his usual state of health until 3 weeks ago when he developed strep through. He saw a doctor who gave him an IM shot of antibitotcs and approximately 5 days of oral antibiotic. He reports completing the antibiotics. He reports that for the past few days he has felt lightheaded. He has also been more tired and having frequent urination. He also reports a sore throat. He reports feeling a little exhausted when walking up the stairs. He is tolerating PO.     In the ED, the patient received 1L NaCl and insulin regular 6 U. [facility] IM was consulted for the evaluation and management of DKA.    Past Medical History:  None    Past Surgical History:  None    Allergies:  Review of patient's allergies indicates:  No Known Allergies    Home Medications:  Prior to Admission medications    Medication Sig Start Date End Date Taking? Authorizing Provider   acetaminophen (TYLENOL) 500 MG tablet Take 500 mg by mouth every 6 (six) hours as needed for Pain.   Yes Historical Provider   tadalafiL (CIALIS) 5 MG tablet Take 5 mg by mouth. 12/27/22 4/26/23  Historical Provider     Family History:  Mother- diabetes  Father- HTN    Social History:   Denies tobacco use  Occasional alcohol use  Denies IVDU    Review of Systems:  Review of Systems   Constitutional:  Negative for chills and fever.   Eyes:  Positive for blurred vision.   Respiratory:  Positive for shortness of breath.    Cardiovascular:  Negative for chest pain.   Gastrointestinal:  Negative for constipation, diarrhea, nausea and vomiting.   Genitourinary:  Positive for  frequency. Negative for dysuria.   Musculoskeletal:  Positive for back pain.   Neurological:  Negative for headaches.     Health Care Maintenance :   Primary Care Physician: Primary Doctor No   Immunizations:   1 dose of COVID vaccine    Cancer Screening:  Colonoscopy: not indicated    Objective:   Last 24 Hour Vital Signs:  BP  Min: 136/87  Max: 145/98  Temp  Av.9 °F (36.6 °C)  Min: 97.9 °F (36.6 °C)  Max: 97.9 °F (36.6 °C)  Pulse  Av.3  Min: 86  Max: 102  Resp  Av.8  Min: 17  Max: 20  SpO2  Av.4 %  Min: 99 %  Max: 100 %  Weight  Av.9 kg (260 lb)  Min: 117.9 kg (260 lb)  Max: 117.9 kg (260 lb)  Body mass index is 34.3 kg/m².  No intake/output data recorded.  Physical Examination:  Physical Exam   Constitutional: normal appearance. No distress.   HENT:   Head: Normocephalic.   Mouth/Throat: Mucous membranes are moist. Oropharynx is clear.   Eyes: Pupils are equal, round, and reactive to light.   Cardiovascular: Normal rate, regular rhythm, normal heart sounds and normal pulses. Pulmonary:      Effort: Pulmonary effort is normal.      Breath sounds: Normal breath sounds.     Abdominal: Soft. Normal appearance. There is no abdominal tenderness.   Neurological: He is alert.   Skin: Skin is warm and dry.   Psychiatric: His behavior is normal. Mood normal.    Laboratory:  Most Recent Data:  CBC:   Lab Results   Component Value Date    WBC 8.68 2023    HGB 13.6 (L) 2023    HCT 40.8 2023     2023    MCV 83 2023    RDW 12.0 2023       BMP:   Lab Results   Component Value Date     (L) 2023    K 4.3 2023     2023    CO2 14 (L) 2023    BUN 14 2023    CREATININE 1.4 2023     (HH) 2023    CALCIUM 8.9 2023     LFTs:   Lab Results   Component Value Date    PROT 8.4 2023    ALBUMIN 3.6 2023    BILITOT 0.3 2023    AST 12 2023    ALKPHOS 108 2023    ALT 14 2023      Coags: No results found for: INR, PROTIME, PTT  FLP: No results found for: CHOL, HDL, LDLCALC, TRIG, CHOLHDL  DM:   Lab Results   Component Value Date    CREATININE 1.4 04/26/2023     Thyroid: No results found for: TSH, FREET4, Q4BLUZQ, H9ZYSOX, THYROIDAB  Anemia: No results found for: IRON, TIBC, FERRITIN, QFQTKGPM98, FOLATE  Cardiac:   Lab Results   Component Value Date    TROPONINI <0.006 04/26/2023    BNP <10 04/26/2023     Urinalysis: No results found for: LABURIN, COLORU, PHUA, CLARITYU, SPECGRAV, LABSPEC, NITRITE, PROTEINUR, GLUCOSEU, KETONESU, UROBILINOGEN, BILIRUBINUR, BLOODU, RBCU, WBCUA  Trended Lab Data:  Recent Labs   Lab 04/26/23  1611   WBC 8.68   HGB 13.6*   HCT 40.8      MCV 83   RDW 12.0   *   K 4.3      CO2 14*   BUN 14   CREATININE 1.4   *   PROT 8.4   ALBUMIN 3.6   BILITOT 0.3   AST 12   ALKPHOS 108   ALT 14     Trended Cardiac Data:  Recent Labs   Lab 04/26/23  1611   TROPONINI <0.006   BNP <10     Microbiology Data:  None    Other Results:  EKG (my interpretation): sinus rhythm with sinus arrythmia     Radiology:  X-Ray Chest PA And Lateral    Result Date: 4/26/2023  EXAMINATION: XR CHEST PA AND LATERAL TECHNIQUE: PA and lateral views of the chest were performed. COMPARISON: None FINDINGS: The cardiac silhouette is normal in size, midline. The pulmonary vascularity is normal. The pulmonary waqas appear normal bilaterally. The lungs are well expanded and clear. No focal airspace disease. No pleural effusion, no pneumothorax. The osseous structures appear within normal limits for age.     No significant abnormality seen. Electronically signed by: Lauren Downing MD Date:    04/26/2023 Time:    16:33       Assessment:     Jonh Figueroa is a 33 y.o. male with no previous PMHx presenting with lightheadedness. Admitted for DKA.      Plan:   DKA  -Glucose to 630 at admit  -Bicarb 14, AG 17  -Mildly acidotic 7.33  -Gave 10 U detemir, started 1/2NS with 20 mEq K at  250/hr  -Insulin drip started  -Q2 BMPs, nursing to adjust fluids per DKA order set    Health Care Maintenance  - Vaccinations needed: covid, flu, tdap, prevnar 20    Ppx: lovenox  Diet: Diabetic  Code: FULL    Disposition: pending resolution/improvement of DKA    La Shin M.D.  [facility] Internal Medicine PGY-1    [facility] Medicine Hospitalist Pager numbers:   [facility] Hospitalist Medicine Team A (Donna/Cordell): [phone]  [facility] Hospitalist Medicine Team B (Tiana/Gabrielle):  [phone]

## 2023-04-27 NOTE — PLAN OF CARE
Pt transferred to 453 via wheelchair, family with him, meds, chart, and belongings with him, his phone and  with him, no co  fanny at bedside

## 2023-04-27 NOTE — PROGRESS NOTES
VIRTUAL NURSE:  Cued into patient's room.  Permission received per patient to turn camera to view patient.  Introduced as VN that will be working with floor nurse and nursing assistant.  Educated patient on VN's role in patient care and  VIP model.  Plan of care reviewed with patient.  Education per flowsheet.   Informed patient that staff will round on them every 2 hours but to use call light for any other needs they may have; informed of fall risk and fall precautions.  Patient verbalized understanding.  Call light within reach; bed siderails up x3.  Opportunity given for questions and questions answered.  Admission assessment questions answered.  Patient denies complaints or any needs at this time. Instructed to call for assistance.  Will cont to monitor and intervene as needed.    Labs, notes, orders, and careplan reviewed.    04/27/23 1714   Admission   Initial VN Admission Questions Complete   Shift   Virtual Nurse - Patient Verbalized Approval Of Camera Use;VN Rounding   Safety/Activity   Patient Rounds bed in low position;call light in patient/parent reach;bed wheels locked;clutter free environment maintained;ID band on;placement of personal items at bedside;visualized patient   Safety Promotion/Fall Prevention Fall Risk reviewed with patient/family

## 2023-04-27 NOTE — PLAN OF CARE
The sw met with the pt to complete the assessment. The pt lives in [place] with Villa Tipton(s/o)[phone]. The pt just started a new job at the [employer] 3 weeks ago and is uninsured currently. The sw left a message for Harleen([facility])notifying her to see if the pt can be screened for Medicaid. The pt's independent with his adl's and doesn't use dme. The pt will require a glucometer,test strips,lancets and insulin at d/c. The sw spoke to the Case Management Supervisors in reference to the pt's uninsured status and the supplies that will be needed at d/c. The pt states he doesn't have a PCP,so he utilizes an Urgent Care clinic in [place] with any medical issues that arises. The pt is amendable to follow up with Dr. Calle in the PCC and a follow up appointment was scheduled. The pt still drives but states his mother will transport him home at d/c. The sw completed the white board in the pt's room and gave him a d/c brochure with her name and contact info on it. The sw encouraged him to call if he has any further questions or concerns. The sw will continue to follow the pt throughout his transitions of care and will assist with any d/c needs.         04/27/23 0938   Discharge Assessment   Assessment Type Discharge Planning Assessment   Confirmed/corrected address, phone number and insurance Yes   Confirmed Demographics Correct on Facesheet   Source of Information patient   When was your last doctors appointment?   (3 months ago)   Communicated WALTER with patient/caregiver Date not available/Unable to determine   Reason For Admission DKA   People in Home significant other   Do you expect to return to your current living situation? Yes   Do you have help at home or someone to help you manage your care at home? Yes   Who are your caregiver(s) and their phone number(s)? Rayna Figueroa(mother)[phone]/[place](s/o)[phone]   Prior to hospitilization cognitive status: Alert/Oriented   Current cognitive status:  Alert/Oriented   Home Accessibility wheelchair accessible   Home Layout Able to live on 1st floor   Equipment Currently Used at Home none   Readmission within 30 days? No   Patient currently being followed by outpatient case management? No   Do you currently have service(s) that help you manage your care at home? No   Do you take prescription medications? No   Do you have prescription coverage? No   Do you have any problems affording any of your prescribed medications? TBD   Is the patient taking medications as prescribed? yes  (pt isn't on any prescription meds)   Who is going to help you get home at discharge? Rayna Figueroa(mother)[phone]/[place](s/o)[phone]   How do you get to doctors appointments? car, drives self   Are you on dialysis? No   Do you take coumadin? No   Discharge Plan A Home with family   Discharge Plan B Home   DME Needed Upon Discharge  glucometer  (test strips,lancets)   Discharge Plan discussed with: Patient   Discharge Barriers Identified Unisured   OTHER   Name(s) of People in Home Villa Tipton(s/o)[phone]

## 2023-04-27 NOTE — PLAN OF CARE
VIRTUAL NURSE:  Labs, notes, orders, and careplan reviewed. VN to be available as needed.    Problem: Adult Inpatient Plan of Care  Goal: Plan of Care Review  Outcome: Ongoing, Progressing  Goal: Patient-Specific Goal (Individualized)  Outcome: Ongoing, Progressing  Goal: Absence of Hospital-Acquired Illness or Injury  Outcome: Ongoing, Progressing  Goal: Optimal Comfort and Wellbeing  Outcome: Ongoing, Progressing  Goal: Readiness for Transition of Care  Outcome: Ongoing, Progressing     Problem: Diabetes Comorbidity  Goal: Blood Glucose Level Within Targeted Range  Outcome: Ongoing, Progressing

## 2023-04-27 NOTE — PHARMACY MED REC
"  Admission Medication History     The home medication history was taken by Carolynn Magallanes CPhT.    Medication history obtained from, Patient Verified    You may go to "Admission" then "Reconcile Home Medications" tabs to review and/or act upon these items.     The home medication list has been updated by the Pharmacy department.   Please read ALL comments highlighted in yellow.   Please address this information as you see fit.    Feel free to contact us if you have any questions or require assistance.      The medications listed below were removed from the home medication list.  Please reorder if appropriate:  Patient reports no longer taking the following medication(s):  Cialis 5 mg        Carolynn Magallaens CPhT.  Ext [phone]             .        "

## 2023-04-27 NOTE — PROGRESS NOTES
The sw [phone] to notify her the pt's uninsured to see if he can be screened for Medicaid.

## 2023-04-28 VITALS
OXYGEN SATURATION: 98 % | DIASTOLIC BLOOD PRESSURE: 92 MMHG | BODY MASS INDEX: 34.46 KG/M2 | TEMPERATURE: 100 F | RESPIRATION RATE: 16 BRPM | HEART RATE: 101 BPM | HEIGHT: 73 IN | WEIGHT: 260 LBS | SYSTOLIC BLOOD PRESSURE: 144 MMHG

## 2023-04-28 LAB
ALBUMIN SERPL BCP-MCNC: 3.1 G/DL (ref 3.5–5.2)
ALP SERPL-CCNC: 75 U/L (ref 55–135)
ALT SERPL W/O P-5'-P-CCNC: 11 U/L (ref 10–44)
ANION GAP SERPL CALC-SCNC: 12 MMOL/L (ref 8–16)
ANION GAP SERPL CALC-SCNC: 13 MMOL/L (ref 8–16)
ANION GAP SERPL CALC-SCNC: 13 MMOL/L (ref 8–16)
AORTIC ROOT ANNULUS: 3.09 CM
AORTIC VALVE CUSP SEPERATION: 2.02 CM
AST SERPL-CCNC: 12 U/L (ref 10–40)
AV INDEX (PROSTH): 0.89
AV MEAN GRADIENT: 5 MMHG
AV PEAK GRADIENT: 10 MMHG
AV VALVE AREA: 3.03 CM2
AV VELOCITY RATIO: 0.85
BASOPHILS # BLD AUTO: 0.05 K/UL (ref 0–0.2)
BASOPHILS NFR BLD: 0.6 % (ref 0–1.9)
BILIRUB SERPL-MCNC: 0.6 MG/DL (ref 0.1–1)
BSA FOR ECHO PROCEDURE: 2.46 M2
BUN SERPL-MCNC: 11 MG/DL (ref 6–20)
BUN SERPL-MCNC: 11 MG/DL (ref 6–20)
BUN SERPL-MCNC: 12 MG/DL (ref 6–20)
C PEPTIDE SERPL-MCNC: 0.56 NG/ML (ref 0.78–5.19)
CALCIUM SERPL-MCNC: 8.8 MG/DL (ref 8.7–10.5)
CHLORIDE SERPL-SCNC: 104 MMOL/L (ref 95–110)
CHLORIDE SERPL-SCNC: 105 MMOL/L (ref 95–110)
CHLORIDE SERPL-SCNC: 105 MMOL/L (ref 95–110)
CO2 SERPL-SCNC: 19 MMOL/L (ref 23–29)
CO2 SERPL-SCNC: 19 MMOL/L (ref 23–29)
CO2 SERPL-SCNC: 20 MMOL/L (ref 23–29)
CREAT SERPL-MCNC: 1 MG/DL (ref 0.5–1.4)
CV ECHO LV RWT: 0.52 CM
DIFFERENTIAL METHOD: ABNORMAL
DOP CALC AO PEAK VEL: 1.58 M/S
DOP CALC AO VTI: 23.1 CM
DOP CALC LVOT AREA: 3.4 CM2
DOP CALC LVOT DIAMETER: 2.08 CM
DOP CALC LVOT PEAK VEL: 1.34 M/S
DOP CALC LVOT STROKE VOLUME: 69.96 CM3
DOP CALC MV VTI: 19.9 CM
DOP CALCLVOT PEAK VEL VTI: 20.6 CM
E WAVE DECELERATION TIME: 193.42 MSEC
E/A RATIO: 1.47
E/E' RATIO: 7.2 M/S
ECHO LV POSTERIOR WALL: 1.09 CM (ref 0.6–1.1)
EJECTION FRACTION: 55 %
EOSINOPHIL # BLD AUTO: 0.1 K/UL (ref 0–0.5)
EOSINOPHIL NFR BLD: 0.8 % (ref 0–8)
ERYTHROCYTE [DISTWIDTH] IN BLOOD BY AUTOMATED COUNT: 12.1 % (ref 11.5–14.5)
EST. GFR  (NO RACE VARIABLE): >60 ML/MIN/1.73 M^2
FERRITIN SERPL-MCNC: 495 NG/ML (ref 20–300)
FOLATE SERPL-MCNC: 11 NG/ML (ref 4–24)
FRACTIONAL SHORTENING: 55 % (ref 28–44)
GLUCOSE SERPL-MCNC: 250 MG/DL (ref 70–110)
GLUCOSE SERPL-MCNC: 250 MG/DL (ref 70–110)
GLUCOSE SERPL-MCNC: 295 MG/DL (ref 70–110)
HCT VFR BLD AUTO: 37.2 % (ref 40–54)
HGB BLD-MCNC: 12.2 G/DL (ref 14–18)
IMM GRANULOCYTES # BLD AUTO: 0.02 K/UL (ref 0–0.04)
IMM GRANULOCYTES NFR BLD AUTO: 0.2 % (ref 0–0.5)
INTERVENTRICULAR SEPTUM: 1.27 CM (ref 0.6–1.1)
IRON SERPL-MCNC: 69 UG/DL (ref 45–160)
LA MAJOR: 5.5 CM
LA MINOR: 5.4 CM
LA WIDTH: 3.3 CM
LEFT ATRIUM SIZE: 3.95 CM
LEFT ATRIUM VOLUME INDEX MOD: 11.5 ML/M2
LEFT ATRIUM VOLUME INDEX: 25.1 ML/M2
LEFT ATRIUM VOLUME MOD: 27.75 CM3
LEFT ATRIUM VOLUME: 60.38 CM3
LEFT INTERNAL DIMENSION IN SYSTOLE: 1.9 CM (ref 2.1–4)
LEFT VENTRICLE DIASTOLIC VOLUME INDEX: 32.74 ML/M2
LEFT VENTRICLE DIASTOLIC VOLUME: 78.91 ML
LEFT VENTRICLE MASS INDEX: 72 G/M2
LEFT VENTRICLE SYSTOLIC VOLUME INDEX: 10.8 ML/M2
LEFT VENTRICLE SYSTOLIC VOLUME: 26.1 ML
LEFT VENTRICULAR INTERNAL DIMENSION IN DIASTOLE: 4.21 CM (ref 3.5–6)
LEFT VENTRICULAR MASS: 174.47 G
LV LATERAL E/E' RATIO: 6 M/S
LV SEPTAL E/E' RATIO: 9 M/S
LVOT MG: 3.46 MMHG
LVOT MV: 0.85 CM/S
LYMPHOCYTES # BLD AUTO: 3.1 K/UL (ref 1–4.8)
LYMPHOCYTES NFR BLD: 34.6 % (ref 18–48)
MAGNESIUM SERPL-MCNC: 1.7 MG/DL (ref 1.6–2.6)
MCH RBC QN AUTO: 27.4 PG (ref 27–31)
MCHC RBC AUTO-ENTMCNC: 32.8 G/DL (ref 32–36)
MCV RBC AUTO: 84 FL (ref 82–98)
MONOCYTES # BLD AUTO: 0.7 K/UL (ref 0.3–1)
MONOCYTES NFR BLD: 8 % (ref 4–15)
MV MEAN GRADIENT: 1 MMHG
MV PEAK A VEL: 0.49 M/S
MV PEAK E VEL: 0.72 M/S
MV PEAK GRADIENT: 2 MMHG
MV STENOSIS PRESSURE HALF TIME: 56.09 MS
MV VALVE AREA BY CONTINUITY EQUATION: 3.52 CM2
MV VALVE AREA P 1/2 METHOD: 3.92 CM2
NEUTROPHILS # BLD AUTO: 5 K/UL (ref 1.8–7.7)
NEUTROPHILS NFR BLD: 55.8 % (ref 38–73)
NRBC BLD-RTO: 0 /100 WBC
OHS LV EJECTION FRACTION SIMPSONS BIPLANE MOD: 6 %
PHOSPHATE SERPL-MCNC: 2.7 MG/DL (ref 2.7–4.5)
PISA TR MAX VEL: 2.31 M/S
PLATELET # BLD AUTO: 212 K/UL (ref 150–450)
PMV BLD AUTO: 11.3 FL (ref 9.2–12.9)
POCT GLUCOSE: 228 MG/DL (ref 70–110)
POCT GLUCOSE: 281 MG/DL (ref 70–110)
POTASSIUM SERPL-SCNC: 3.6 MMOL/L (ref 3.5–5.1)
POTASSIUM SERPL-SCNC: 3.7 MMOL/L (ref 3.5–5.1)
POTASSIUM SERPL-SCNC: 3.7 MMOL/L (ref 3.5–5.1)
PROT SERPL-MCNC: 7.2 G/DL (ref 6–8.4)
PV MV: 0.84 M/S
PV PEAK VELOCITY: 1.2 CM/S
RA MAJOR: 3.6 CM
RA PRESSURE: 3 MMHG
RA WIDTH: 3.4 CM
RBC # BLD AUTO: 4.45 M/UL (ref 4.6–6.2)
RIGHT VENTRICULAR END-DIASTOLIC DIMENSION: 3.07 CM
SATURATED IRON: 31 % (ref 20–50)
SODIUM SERPL-SCNC: 136 MMOL/L (ref 136–145)
SODIUM SERPL-SCNC: 137 MMOL/L (ref 136–145)
SODIUM SERPL-SCNC: 137 MMOL/L (ref 136–145)
TDI LATERAL: 0.12 M/S
TDI SEPTAL: 0.08 M/S
TDI: 0.1 M/S
TOTAL IRON BINDING CAPACITY: 222 UG/DL (ref 250–450)
TR MAX PG: 21 MMHG
TRANSFERRIN SERPL-MCNC: 150 MG/DL (ref 200–375)
TV REST PULMONARY ARTERY PRESSURE: 24 MMHG
VIT B12 SERPL-MCNC: 862 PG/ML (ref 210–950)
WBC # BLD AUTO: 9.01 K/UL (ref 3.9–12.7)

## 2023-04-28 PROCEDURE — 25000003 PHARM REV CODE 250: Performed by: STUDENT IN AN ORGANIZED HEALTH CARE EDUCATION/TRAINING PROGRAM

## 2023-04-28 PROCEDURE — 82607 VITAMIN B-12: CPT

## 2023-04-28 PROCEDURE — 25000003 PHARM REV CODE 250

## 2023-04-28 PROCEDURE — 82728 ASSAY OF FERRITIN: CPT

## 2023-04-28 PROCEDURE — 84466 ASSAY OF TRANSFERRIN: CPT

## 2023-04-28 PROCEDURE — 84681 ASSAY OF C-PEPTIDE: CPT | Performed by: STUDENT IN AN ORGANIZED HEALTH CARE EDUCATION/TRAINING PROGRAM

## 2023-04-28 PROCEDURE — 80053 COMPREHEN METABOLIC PANEL: CPT | Performed by: STUDENT IN AN ORGANIZED HEALTH CARE EDUCATION/TRAINING PROGRAM

## 2023-04-28 PROCEDURE — 36415 COLL VENOUS BLD VENIPUNCTURE: CPT | Performed by: STUDENT IN AN ORGANIZED HEALTH CARE EDUCATION/TRAINING PROGRAM

## 2023-04-28 PROCEDURE — 82746 ASSAY OF FOLIC ACID SERUM: CPT

## 2023-04-28 PROCEDURE — 85025 COMPLETE CBC W/AUTO DIFF WBC: CPT | Performed by: STUDENT IN AN ORGANIZED HEALTH CARE EDUCATION/TRAINING PROGRAM

## 2023-04-28 PROCEDURE — 36415 COLL VENOUS BLD VENIPUNCTURE: CPT

## 2023-04-28 PROCEDURE — 83735 ASSAY OF MAGNESIUM: CPT | Performed by: STUDENT IN AN ORGANIZED HEALTH CARE EDUCATION/TRAINING PROGRAM

## 2023-04-28 PROCEDURE — 84100 ASSAY OF PHOSPHORUS: CPT | Performed by: STUDENT IN AN ORGANIZED HEALTH CARE EDUCATION/TRAINING PROGRAM

## 2023-04-28 RX ORDER — LIDOCAINE HYDROCHLORIDE 20 MG/ML
15 SOLUTION OROPHARYNGEAL EVERY 4 HOURS
Qty: 200 ML | Refills: 0 | Status: CANCELLED | OUTPATIENT
Start: 2023-04-28

## 2023-04-28 RX ORDER — LIDOCAINE HYDROCHLORIDE 20 MG/ML
15 SOLUTION OROPHARYNGEAL ONCE
Status: COMPLETED | OUTPATIENT
Start: 2023-04-28 | End: 2023-04-28

## 2023-04-28 RX ORDER — ACETAMINOPHEN 500 MG
1000 TABLET ORAL ONCE
Status: COMPLETED | OUTPATIENT
Start: 2023-04-28 | End: 2023-04-28

## 2023-04-28 RX ADMIN — MUPIROCIN: 20 OINTMENT TOPICAL at 08:04

## 2023-04-28 RX ADMIN — ACETAMINOPHEN 1000 MG: 500 TABLET ORAL at 04:04

## 2023-04-28 RX ADMIN — LIDOCAINE HYDROCHLORIDE 15 ML: 20 SOLUTION ORAL at 12:04

## 2023-04-28 RX ADMIN — INSULIN ASPART 6 UNITS: 100 INJECTION, SOLUTION INTRAVENOUS; SUBCUTANEOUS at 08:04

## 2023-04-28 RX ADMIN — INSULIN ASPART 4 UNITS: 100 INJECTION, SOLUTION INTRAVENOUS; SUBCUTANEOUS at 12:04

## 2023-04-28 RX ADMIN — POTASSIUM BICARBONATE 50 MEQ: 978 TABLET, EFFERVESCENT ORAL at 02:04

## 2023-04-28 NOTE — PLAN OF CARE
Patient discharged. Discharge orders noted. Additional clinical references attached.    Patient's discharge instructions given by bedside RN and reviewed via this VN.  Education provided on new medication, diagnosis, and follow-up appointments.  Teach back method used. Patient verbalized understanding. All questions answered. Transport to [facility] requested.  Patient with complains of throat pain. Call placed to team, updated Dr Medley and bedside RN  Sheri.  MD states they will come up to see patient.

## 2023-04-28 NOTE — PLAN OF CARE
PCC follow-up scheduled. Patient is now Medicaid Pending.    Discharge orders noted. No DME or Home Health ordered. PCC follow-up scheduled. No further Case Management needs.    1329-- met with patient and follow-up information reviewed. Medications delivered at bedside. He will have family transport home at discharge. No further Case Management needs.    Patient Contacts    Name Relation Home Work Mobile   Rayna Figueroa   [phone]   BEA CONN Significant other   [phone]     Future Appointments   Date Time Provider Department Center   5/16/2023  1:30 PM Becky Calle MD [facility]       04/28/23 1143   Final Note   Assessment Type Final Discharge Note   Anticipated Discharge Disposition Home   Hospital Resources/Appts/Education Provided Appointments scheduled by Navigator/Coordinator   Post-Acute Status   Discharge Delays None known at this time     Dorinda Desai RN    [phone]

## 2023-04-28 NOTE — PROGRESS NOTES
"[facility]                    Pharmacy       Discharge Medication Education    Patient ACCEPTED medication education. Pharmacy has provided education on the name, indication, and possible side effects of the medication(s) prescribed, using teach-back method.     The following medications have also been discussed, during this admission.        Medication List        START taking these medications      BD ULTRA-FINE PARK PEN NEEDLE 32 gauge x 5/32" Ndle  Generic drug: pen needle, diabetic  use as directed to inject insulin     LEVEMIR FLEXPEN 100 unit/mL (3 mL) Inpn pen  Generic drug: insulin detemir U-100 (Levemir)  Inject 15 Units into the skin every evening.     TRUE METRIX GLUCOSE METER Misc  Generic drug: blood-glucose meter  use as directed     TRUE METRIX GLUCOSE TEST STRIP Strp  Generic drug: blood sugar diagnostic  use as directed to check blood sugar every morning after meal and every evening     TRUEPLUS LANCETS 30 gauge Misc  Generic drug: lancets  use as directed to check blood sugar every morning after meal and every evening            STOP taking these medications      acetaminophen 500 MG tablet  Commonly known as: TYLENOL               Where to Get Your Medications        These medications were sent to [facility]  [address], [place] [address]      Hours: Mon-Fri, 8a-5:30p Phone: [phone]   BD ULTRA-FINE PARK PEN NEEDLE 32 gauge x 5/32" Ndle  LEVEMIR FLEXPEN 100 unit/mL (3 mL) Inpn pen  TRUE METRIX GLUCOSE METER Misc  TRUE METRIX GLUCOSE TEST STRIP Strp  TRUEPLUS LANCETS 30 gauge Misc          Thank you  Kalee Hamm, PharmD  [phone]    "

## 2023-04-28 NOTE — PLAN OF CARE
Problem: Adult Inpatient Plan of Care  Goal: Plan of Care Review  4/27/2023 2044 by Shahla Quinn, HARMAN  Outcome: Ongoing, Progressing  Chart and care plan reviewed

## 2023-04-28 NOTE — PLAN OF CARE
VIRTUAL NURSE:  Labs, notes, orders, and careplan reviewed. VN to be available as needed.    Problem: Adult Inpatient Plan of Care  Goal: Plan of Care Review  Outcome: Ongoing, Progressing  Goal: Patient-Specific Goal (Individualized)  Outcome: Ongoing, Progressing  Goal: Absence of Hospital-Acquired Illness or Injury  Outcome: Ongoing, Progressing  Goal: Optimal Comfort and Wellbeing  Outcome: Ongoing, Progressing  Goal: Readiness for Transition of Care  Outcome: Ongoing, Progressing     Problem: Diabetes Comorbidity  Goal: Blood Glucose Level Within Targeted Range  Outcome: Ongoing, Progressing     Problem: Diabetic Ketoacidosis  Goal: Fluid and Electrolyte Balance with Absence of Ketosis  Outcome: Ongoing, Progressing     Problem: Hyperglycemia  Goal: Blood Glucose Level Within Targeted Range  Outcome: Ongoing, Progressing

## 2023-04-28 NOTE — DISCHARGE SUMMARY
[facility] Hospital Medicine Discharge Summary    Primary Team: [facility] Hospitalist Team B  Attending Physician: Alvaro Anthony MD  Resident: Brian  Intern: Aleida    Date of Admit: 4/26/2023  Date of Discharge: 4/28/2023    Discharge to: Home  Condition: Stable    Discharge Diagnoses     Patient Active Problem List   Diagnosis    DKA (diabetic ketoacidosis)       Consultants and Procedures     Consultants:  Dietician  Diabetic Educator    Procedures:   None    Imaging:  CXR  TTE    Brief History of Present Illness & Summary of Hospital Course      Jonh Figueroa is a 33 y.o.male with no pmh who presented on 4/26/2023 for generalized weakness. Pt found to be in mild DKA Pt's anion gap quickly closed on insulin drip. Pt felt well during hospital stay. He has been eating without issue. On the day of discharge, patient was afebrile with stable vital signs and will be discharged in stable condition to home with close follow up in our priority clinic and referred to PCP.    For the full HPI please refer to the History & Physical from this admission.    Hospital Course By Problem with Pertinent Findings     DKA, resolved  Newly Diagnosed Diabetes.  -Elevated glucose, anion gap with ketones in urine on admission. Gap closed after insulin drip and currently eating well with controlled glucose on detemir.  -Dieticians and Diabetes educator have met with patient for education  -A1c 11.3, insulin and GADA antibodies and c-peptide ordered to evaluate for T1DM, pending. Follow up as outpatient  -Discharge on 15 U detemir nightly and glucose monitoring supplies, bedside delivered  -Close follow up, will be seen within 1 week in the priority clinic and PCP to establish care  -Did not prescribe metformin or any other orals as unclear if DM is Type 1,2 or mixed.     Normocytic Anemia  -H/H 12.2/37.2 MCV 84 on 4/28  -Likely from IVF during admission, ferritin elevated consistent with possible chronic disease component.     Suspected Flow  "Murmur  -Suspected flow murmur  -TTE: left ventricle is normal in size with concentric remodeling and normal systolic function. The estimated ejection fraction is 55%. No valvular abnormalities.     Health Care Maintenance  - Vaccinations needed: covid, flu, tdap, prevnar 20    Discharge Medications        Medication List        START taking these medications      BD ULTRA-FINE PARK PEN NEEDLE 32 gauge x 5/32" Ndle  Generic drug: pen needle, diabetic  use as directed to inject insulin     LEVEMIR FLEXPEN 100 unit/mL (3 mL) Inpn pen  Generic drug: insulin detemir U-100 (Levemir)  Inject 15 Units into the skin every evening.     TRUE METRIX GLUCOSE METER Misc  Generic drug: blood-glucose meter  use as directed     TRUE METRIX GLUCOSE TEST STRIP Strp  Generic drug: blood sugar diagnostic  use as directed to check blood sugar every morning after meal and every evening     TRUEPLUS LANCETS 30 gauge Misc  Generic drug: lancets  use as directed to check blood sugar every morning after meal and every evening            STOP taking these medications      acetaminophen 500 MG tablet  Commonly known as: TYLENOL               Where to Get Your Medications        These medications were sent to [facility]  [address], [place] [address]      Hours: Mon-Fri, 8a-5:30p Phone: [phone]   BD ULTRA-FINE PARK PEN NEEDLE 32 gauge x 5/32" Ndle  LEVEMIR FLEXPEN 100 unit/mL (3 mL) Inpn pen  TRUE METRIX GLUCOSE METER Misc  TRUE METRIX GLUCOSE TEST STRIP Strp  TRUEPLUS LANCETS 30 gauge Misc          Discharge Information:   Diet:  Diabetic    Physical Activity:  As tolerated             Instructions:  1. Take all medications as prescribed  2. Keep all follow-up appointments  3. Return to the hospital or call your primary care physicians if any worsening symptoms such as fever, chest pain, shortness of breath, return of symptoms, or any other concerns.    Follow-Up Appointments:  PCP  [facility]    Brayan " MD Brian  [facility] Internal Medicine PGY-2

## 2023-04-28 NOTE — PROGRESS NOTES
" [facility] Medicine Progress Note    Primary Team: [facility] Hospitalist Team B  Attending Physician: Alvaro Anthony MD  Resident: Brian  Intern: [facility] Day: 2 days    Chief Complaint: Weakness    Subjective:      Patient seen and examined by me this morning.  No focal complaints, states he feels ready to leave the hospital He also endorses a sore throat. Reports resolution of frequent urination.     Objective:   Last 24 Hour Vital Signs:  BP  Min: 128/85  Max: 153/99  Temp  Av.7 °F (36.5 °C)  Min: 96.6 °F (35.9 °C)  Max: 99 °F (37.2 °C)  Pulse  Av.5  Min: 84  Max: 120  Resp  Av.6  Min: 15  Max: 24  SpO2  Av.9 %  Min: 98 %  Max: 100 %  Height  Av' 1" (185.4 cm)  Min: 6' 1" (185.4 cm)  Max: 6' 1" (185.4 cm)  Weight  Av.9 kg (260 lb)  Min: 117.9 kg (260 lb)  Max: 117.9 kg (260 lb)    Intake/Output Summary (Last 24 hours) at 2023 0740  Last data filed at 2023 0400  Gross per 24 hour   Intake --   Output 0 ml   Net 0 ml        Physical Examination:  Physical Exam  Constitutional:       Appearance: He is normal weight.   HENT:      Head: Normocephalic and atraumatic.      Right Ear: External ear normal.      Left Ear: External ear normal.      Nose: Nose normal.      Mouth/Throat:      Mouth: Mucous membranes are moist.      Pharynx: No posterior oropharyngeal erythema.   Eyes:      Extraocular Movements: Extraocular movements intact.      Conjunctiva/sclera: Conjunctivae normal.   Cardiovascular:      Rate and Rhythm: Normal rate and regular rhythm.      Heart sounds: No murmur heard.  Pulmonary:      Effort: Pulmonary effort is normal.      Breath sounds: Normal breath sounds. No stridor.   Abdominal:      General: Abdomen is flat. Bowel sounds are normal.      Palpations: Abdomen is soft.   Musculoskeletal:         General: Normal range of motion.   Skin:     General: Skin is warm and dry.      Coloration: Skin is not jaundiced.   Neurological:      Mental Status: He " is alert and oriented to person, place, and time. Mental status is at baseline.   Psychiatric:         Mood and Affect: Mood normal.         Behavior: Behavior normal.      Laboratory:  Recent Labs   Lab 04/26/23  1611 04/26/23 2019 04/27/23  0439 04/27/23  0757 04/27/23  1221 04/27/23  2350 04/28/23  0321   WBC 8.68  --  10.02  --   --   --  9.01   HGB 13.6*  --  11.3*  --   --   --  12.2*   HCT 40.8  --  34.1*  --   --   --  37.2*     --  217  --   --   --  212   MCV 83  --  84  --   --   --  84   RDW 12.0  --  12.0  --   --   --  12.1   *   < > 135*  135*  135*   < > 135* 136 137  137   K 4.3   < > 3.7  3.7  3.7   < > 3.9 3.6 3.7  3.7      < > 107  107  107   < > 107 104 105  105   CO2 14*   < > 17*  17*  17*   < > 17* 20* 19*  19*   BUN 14   < > 9  9  9   < > 8 12 11  11   CREATININE 1.4   < > 1.0  1.0  1.0   < > 1.1 1.0 1.0  1.0   *   < > 323*  323*  323*   < > 344* 295* 250*  250*   PROT 8.4  --  6.8  --   --   --  7.2   ALBUMIN 3.6  --  2.9*  --   --   --  3.1*   BILITOT 0.3  --  0.5  --   --   --  0.6   AST 12  --  10  --   --   --  12   ALKPHOS 108  --  70  --   --   --  75   ALT 14  --  11  --   --   --  11    < > = values in this interval not displayed.         Microbiology Results (last 7 days)       ** No results found for the last 168 hours. **             I have personally reviewed the above laboratory studies.     Imaging:  X-Ray Chest PA And Lateral    Result Date: 4/26/2023  EXAMINATION: XR CHEST PA AND LATERAL TECHNIQUE: PA and lateral views of the chest were performed. COMPARISON: None FINDINGS: The cardiac silhouette is normal in size, midline. The pulmonary vascularity is normal. The pulmonary waqas appear normal bilaterally. The lungs are well expanded and clear. No focal airspace disease. No pleural effusion, no pneumothorax. The osseous structures appear within normal limits for age.     No significant abnormality seen. Electronically signed  by: Lauren Downing MD Date:    04/26/2023 Time:    16:33     Current Medications:     Scheduled:   enoxaparin  40 mg Subcutaneous Daily    insulin detemir U-100  10 Units Subcutaneous QHS    mupirocin   Nasal BID         Infusions:       PRN:  dextrose 10%, dextrose 10%, dextrose, dextrose, glucagon (human recombinant), glucagon (human recombinant), insulin aspart U-100, naloxone, sodium chloride 0.9%  Antibiotics and Day Number of Therapy:  Antibiotics (From admission, onward)      Start     Stop Route Frequency Ordered    04/27/23 0900  mupirocin 2 % ointment         05/02 0859 Nasl 2 times daily 04/27/23 0244                Assessment:     Jonh Figueroa is a 33 y.o. male with no past medical history who presents with DKA    Plan:     DKA, resolved  Newly Diagnosed Diabetes.  -GAP closed, patient eating, off insulin drip since this morning  -Continue Glucose checks, SSI, and 10U Detemir QN  -Plan to monitor morning glucose with detemir to determine discharge dose.  -Consulted diabetes education and dietician  -A1c 11.3, insulin and GADA antibodies and c-peptide ordered to evaluate for T1DM     Normocytic Anemia  -H/H 12.2/37.2 MCV 84 on 4/28  -Iron studies, B12, and folate pending    Suspected Flow Murmur  -Suspected flow murmur  -TTE to further assess  -TTE: left ventricle is normal in size with concentric remodeling and normal systolic function. The estimated ejection fraction is 55%.    Health Care Maintenance  - Vaccinations needed: covid, flu, tdap, prevnar 20     Ppx: lovenox  Diet: Diabetic  Code: FULL    Dispo: Transfer to floor    La Shin MD  [facility] PGY-1    hospitals Medicine Hospitalist Pager numbers:   hospitals Hospitalist Medicine Team A (Donna/Cordell): [phone]  hospitals Hospitalist Medicine Team B (Tiana/Gabrielle):  [phone]

## 2023-04-28 NOTE — PLAN OF CARE
Problem: Adult Inpatient Plan of Care  Goal: Plan of Care Review  Outcome: Ongoing, Progressing  Goal: Patient-Specific Goal (Individualized)  Outcome: Ongoing, Progressing  Goal: Absence of Hospital-Acquired Illness or Injury  Outcome: Ongoing, Progressing  Goal: Optimal Comfort and Wellbeing  Outcome: Ongoing, Progressing  Goal: Readiness for Transition of Care  Outcome: Ongoing, Progressing     Problem: Diabetes Comorbidity  Goal: Blood Glucose Level Within Targeted Range  Outcome: Ongoing, Progressing     Problem: Diabetic Ketoacidosis  Goal: Fluid and Electrolyte Balance with Absence of Ketosis  Outcome: Ongoing, Progressing     Problem: Hyperglycemia  Goal: Blood Glucose Level Within Targeted Range  Outcome: Ongoing, Progressing

## 2023-04-29 ENCOUNTER — HOSPITAL ENCOUNTER (INPATIENT)
Facility: HOSPITAL | Age: 33
LOS: 3 days | Discharge: HOME | DRG: 988 | End: 2023-05-02
Attending: EMERGENCY MEDICINE | Admitting: INTERNAL MEDICINE
Payer: MEDICAID

## 2023-04-29 DIAGNOSIS — E11.10 DKA, TYPE 2: ICD-10-CM

## 2023-04-29 DIAGNOSIS — J36 PERITONSILLAR ABSCESS: ICD-10-CM

## 2023-04-29 DIAGNOSIS — J02.0 PHARYNGITIS DUE TO STREPTOCOCCUS SPECIES: ICD-10-CM

## 2023-04-29 DIAGNOSIS — J03.90 TONSILLITIS: ICD-10-CM

## 2023-04-29 DIAGNOSIS — E13.9 DIABETES 1.5, MANAGED AS TYPE 1: Primary | ICD-10-CM

## 2023-04-29 DIAGNOSIS — J02.9 SORE THROAT: ICD-10-CM

## 2023-04-29 DIAGNOSIS — R73.9 HYPERGLYCEMIA: ICD-10-CM

## 2023-04-29 DIAGNOSIS — J36 TONSILLAR ABSCESS: ICD-10-CM

## 2023-04-29 DIAGNOSIS — E11.10 DKA (DIABETIC KETOACIDOSIS): ICD-10-CM

## 2023-04-29 LAB
ALLENS TEST: ABNORMAL
ANION GAP SERPL CALC-SCNC: 15 MMOL/L (ref 8–16)
ANION GAP SERPL CALC-SCNC: 15 MMOL/L (ref 8–16)
ANION GAP SERPL CALC-SCNC: 17 MMOL/L (ref 8–16)
ANION GAP SERPL CALC-SCNC: 18 MMOL/L (ref 8–16)
B-OH-BUTYR BLD STRIP-SCNC: 4.7 MMOL/L (ref 0–0.5)
B-OH-BUTYR BLD STRIP-SCNC: 4.7 MMOL/L (ref 0–0.5)
BACTERIA #/AREA URNS HPF: NORMAL /HPF
BASOPHILS # BLD AUTO: 0.04 K/UL (ref 0–0.2)
BASOPHILS NFR BLD: 0.3 % (ref 0–1.9)
BILIRUB UR QL STRIP: NEGATIVE
BUN SERPL-MCNC: 12 MG/DL (ref 6–20)
CALCIUM SERPL-MCNC: 9.2 MG/DL (ref 8.7–10.5)
CALCIUM SERPL-MCNC: 9.5 MG/DL (ref 8.7–10.5)
CALCIUM SERPL-MCNC: 9.5 MG/DL (ref 8.7–10.5)
CALCIUM SERPL-MCNC: 9.9 MG/DL (ref 8.7–10.5)
CHLORIDE SERPL-SCNC: 103 MMOL/L (ref 95–110)
CHLORIDE SERPL-SCNC: 107 MMOL/L (ref 95–110)
CHLORIDE SERPL-SCNC: 107 MMOL/L (ref 95–110)
CHLORIDE SERPL-SCNC: 109 MMOL/L (ref 95–110)
CLARITY UR: CLEAR
CO2 SERPL-SCNC: 12 MMOL/L (ref 23–29)
CO2 SERPL-SCNC: 12 MMOL/L (ref 23–29)
CO2 SERPL-SCNC: 14 MMOL/L (ref 23–29)
CO2 SERPL-SCNC: 14 MMOL/L (ref 23–29)
COLOR UR: YELLOW
CREAT SERPL-MCNC: 1.1 MG/DL (ref 0.5–1.4)
CREAT SERPL-MCNC: 1.3 MG/DL (ref 0.5–1.4)
CREAT SERPL-MCNC: 1.3 MG/DL (ref 0.5–1.4)
CREAT SERPL-MCNC: 1.4 MG/DL (ref 0.5–1.4)
DELSYS: ABNORMAL
DIFFERENTIAL METHOD: ABNORMAL
EOSINOPHIL # BLD AUTO: 0 K/UL (ref 0–0.5)
EOSINOPHIL NFR BLD: 0 % (ref 0–8)
ERYTHROCYTE [DISTWIDTH] IN BLOOD BY AUTOMATED COUNT: 12.7 % (ref 11.5–14.5)
EST. GFR  (NO RACE VARIABLE): >60 ML/MIN/1.73 M^2
GLUCOSE SERPL-MCNC: 211 MG/DL (ref 70–110)
GLUCOSE SERPL-MCNC: 276 MG/DL (ref 70–110)
GLUCOSE SERPL-MCNC: 280 MG/DL (ref 70–110)
GLUCOSE SERPL-MCNC: 280 MG/DL (ref 70–110)
GLUCOSE UR QL STRIP: ABNORMAL
GROUP A STREP, MOLECULAR: NEGATIVE
HCO3 UR-SCNC: 14.3 MMOL/L (ref 24–28)
HCT VFR BLD AUTO: 41.1 % (ref 40–54)
HETEROPH AB SERPL QL IA: NEGATIVE
HGB BLD-MCNC: 13.7 G/DL (ref 14–18)
HGB UR QL STRIP: NEGATIVE
HYALINE CASTS #/AREA URNS LPF: 0 /LPF
IMM GRANULOCYTES # BLD AUTO: 0.04 K/UL (ref 0–0.04)
IMM GRANULOCYTES NFR BLD AUTO: 0.3 % (ref 0–0.5)
KETONES UR QL STRIP: ABNORMAL
LACTATE SERPL-SCNC: 1.2 MMOL/L (ref 0.5–2.2)
LEUKOCYTE ESTERASE UR QL STRIP: NEGATIVE
LYMPHOCYTES # BLD AUTO: 2.1 K/UL (ref 1–4.8)
LYMPHOCYTES NFR BLD: 17.3 % (ref 18–48)
MAGNESIUM SERPL-MCNC: 1.8 MG/DL (ref 1.6–2.6)
MCH RBC QN AUTO: 27.8 PG (ref 27–31)
MCHC RBC AUTO-ENTMCNC: 33.3 G/DL (ref 32–36)
MCV RBC AUTO: 84 FL (ref 82–98)
MICROSCOPIC COMMENT: NORMAL
MONOCYTES # BLD AUTO: 0.9 K/UL (ref 0.3–1)
MONOCYTES NFR BLD: 7.5 % (ref 4–15)
NEUTROPHILS # BLD AUTO: 8.9 K/UL (ref 1.8–7.7)
NEUTROPHILS NFR BLD: 74.6 % (ref 38–73)
NITRITE UR QL STRIP: NEGATIVE
NRBC BLD-RTO: 0 /100 WBC
PCO2 BLDA: 28 MMHG (ref 35–45)
PH SMN: 7.32 [PH] (ref 7.35–7.45)
PH UR STRIP: 6 [PH] (ref 5–8)
PHOSPHATE SERPL-MCNC: 2 MG/DL (ref 2.7–4.5)
PHOSPHATE SERPL-MCNC: 2.8 MG/DL (ref 2.7–4.5)
PHOSPHATE SERPL-MCNC: 3.3 MG/DL (ref 2.7–4.5)
PHOSPHATE SERPL-MCNC: 3.4 MG/DL (ref 2.7–4.5)
PLATELET # BLD AUTO: 211 K/UL (ref 150–450)
PMV BLD AUTO: 11.9 FL (ref 9.2–12.9)
PO2 BLDA: 48 MMHG (ref 40–60)
POC BE: -12 MMOL/L
POC SATURATED O2: 81 % (ref 95–100)
POC TCO2: 15 MMOL/L (ref 24–29)
POCT GLUCOSE: 184 MG/DL (ref 70–110)
POCT GLUCOSE: 188 MG/DL (ref 70–110)
POCT GLUCOSE: 225 MG/DL (ref 70–110)
POCT GLUCOSE: 234 MG/DL (ref 70–110)
POCT GLUCOSE: 249 MG/DL (ref 70–110)
POTASSIUM SERPL-SCNC: 3.8 MMOL/L (ref 3.5–5.1)
POTASSIUM SERPL-SCNC: 4.1 MMOL/L (ref 3.5–5.1)
POTASSIUM SERPL-SCNC: 4.1 MMOL/L (ref 3.5–5.1)
POTASSIUM SERPL-SCNC: 4.2 MMOL/L (ref 3.5–5.1)
PROT UR QL STRIP: ABNORMAL
RBC # BLD AUTO: 4.92 M/UL (ref 4.6–6.2)
RBC #/AREA URNS HPF: 0 /HPF (ref 0–4)
SAMPLE: ABNORMAL
SITE: ABNORMAL
SODIUM SERPL-SCNC: 135 MMOL/L (ref 136–145)
SODIUM SERPL-SCNC: 136 MMOL/L (ref 136–145)
SP GR UR STRIP: >1.03 (ref 1–1.03)
URN SPEC COLLECT METH UR: ABNORMAL
UROBILINOGEN UR STRIP-ACNC: NEGATIVE EU/DL
WBC # BLD AUTO: 11.96 K/UL (ref 3.9–12.7)
WBC #/AREA URNS HPF: 2 /HPF (ref 0–5)
YEAST URNS QL MICRO: NORMAL

## 2023-04-29 PROCEDURE — 12000002 HC ACUTE/MED SURGE SEMI-PRIVATE ROOM

## 2023-04-29 PROCEDURE — 25000003 PHARM REV CODE 250: Performed by: PHYSICIAN ASSISTANT

## 2023-04-29 PROCEDURE — 84100 ASSAY OF PHOSPHORUS: CPT | Performed by: PHYSICIAN ASSISTANT

## 2023-04-29 PROCEDURE — 80048 BASIC METABOLIC PNL TOTAL CA: CPT | Performed by: EMERGENCY MEDICINE

## 2023-04-29 PROCEDURE — 93010 ELECTROCARDIOGRAM REPORT: CPT | Mod: ,,, | Performed by: INTERNAL MEDICINE

## 2023-04-29 PROCEDURE — 93005 ELECTROCARDIOGRAM TRACING: CPT

## 2023-04-29 PROCEDURE — 82962 GLUCOSE BLOOD TEST: CPT

## 2023-04-29 PROCEDURE — 93010 EKG 12-LEAD: ICD-10-PCS | Mod: ,,, | Performed by: INTERNAL MEDICINE

## 2023-04-29 PROCEDURE — 83605 ASSAY OF LACTIC ACID: CPT | Performed by: STUDENT IN AN ORGANIZED HEALTH CARE EDUCATION/TRAINING PROGRAM

## 2023-04-29 PROCEDURE — 86308 HETEROPHILE ANTIBODY SCREEN: CPT | Performed by: PHYSICIAN ASSISTANT

## 2023-04-29 PROCEDURE — 25500020 PHARM REV CODE 255: Performed by: EMERGENCY MEDICINE

## 2023-04-29 PROCEDURE — 99222 PR INITIAL HOSPITAL CARE,LEVL II: ICD-10-PCS | Mod: 25,,, | Performed by: OTOLARYNGOLOGY

## 2023-04-29 PROCEDURE — 96361 HYDRATE IV INFUSION ADD-ON: CPT

## 2023-04-29 PROCEDURE — 25000003 PHARM REV CODE 250: Performed by: OTOLARYNGOLOGY

## 2023-04-29 PROCEDURE — 25000003 PHARM REV CODE 250: Performed by: STUDENT IN AN ORGANIZED HEALTH CARE EDUCATION/TRAINING PROGRAM

## 2023-04-29 PROCEDURE — 63600175 PHARM REV CODE 636 W HCPCS: Performed by: INTERNAL MEDICINE

## 2023-04-29 PROCEDURE — 20000000 HC ICU ROOM

## 2023-04-29 PROCEDURE — 87651 STREP A DNA AMP PROBE: CPT | Performed by: PHYSICIAN ASSISTANT

## 2023-04-29 PROCEDURE — 82803 BLOOD GASES ANY COMBINATION: CPT

## 2023-04-29 PROCEDURE — 96365 THER/PROPH/DIAG IV INF INIT: CPT

## 2023-04-29 PROCEDURE — 99222 1ST HOSP IP/OBS MODERATE 55: CPT | Mod: 25,,, | Performed by: OTOLARYNGOLOGY

## 2023-04-29 PROCEDURE — 63600175 PHARM REV CODE 636 W HCPCS: Performed by: STUDENT IN AN ORGANIZED HEALTH CARE EDUCATION/TRAINING PROGRAM

## 2023-04-29 PROCEDURE — 42700 I&D ABSCESS PERITONSILLAR: CPT

## 2023-04-29 PROCEDURE — 25000003 PHARM REV CODE 250: Performed by: EMERGENCY MEDICINE

## 2023-04-29 PROCEDURE — 81000 URINALYSIS NONAUTO W/SCOPE: CPT | Performed by: PHYSICIAN ASSISTANT

## 2023-04-29 PROCEDURE — 87147 CULTURE TYPE IMMUNOLOGIC: CPT | Mod: 59 | Performed by: OTOLARYNGOLOGY

## 2023-04-29 PROCEDURE — 84100 ASSAY OF PHOSPHORUS: CPT | Mod: 91 | Performed by: PHYSICIAN ASSISTANT

## 2023-04-29 PROCEDURE — 63600175 PHARM REV CODE 636 W HCPCS: Performed by: PHYSICIAN ASSISTANT

## 2023-04-29 PROCEDURE — 87070 CULTURE OTHR SPECIMN AEROBIC: CPT | Performed by: OTOLARYNGOLOGY

## 2023-04-29 PROCEDURE — 80048 BASIC METABOLIC PNL TOTAL CA: CPT | Mod: 91 | Performed by: STUDENT IN AN ORGANIZED HEALTH CARE EDUCATION/TRAINING PROGRAM

## 2023-04-29 PROCEDURE — 82010 KETONE BODYS QUAN: CPT | Performed by: EMERGENCY MEDICINE

## 2023-04-29 PROCEDURE — 96375 TX/PRO/DX INJ NEW DRUG ADDON: CPT

## 2023-04-29 PROCEDURE — 87075 CULTR BACTERIA EXCEPT BLOOD: CPT | Mod: 59 | Performed by: OTOLARYNGOLOGY

## 2023-04-29 PROCEDURE — 83735 ASSAY OF MAGNESIUM: CPT | Performed by: PHYSICIAN ASSISTANT

## 2023-04-29 PROCEDURE — 80048 BASIC METABOLIC PNL TOTAL CA: CPT | Mod: 91 | Performed by: PHYSICIAN ASSISTANT

## 2023-04-29 PROCEDURE — 42700 I&D ABSCESS PERITONSILLAR: CPT | Mod: ,,, | Performed by: OTOLARYNGOLOGY

## 2023-04-29 PROCEDURE — 99291 CRITICAL CARE FIRST HOUR: CPT

## 2023-04-29 PROCEDURE — 63600175 PHARM REV CODE 636 W HCPCS: Performed by: EMERGENCY MEDICINE

## 2023-04-29 PROCEDURE — 99900035 HC TECH TIME PER 15 MIN (STAT)

## 2023-04-29 PROCEDURE — 85025 COMPLETE CBC W/AUTO DIFF WBC: CPT | Performed by: PHYSICIAN ASSISTANT

## 2023-04-29 PROCEDURE — 42700 PR INC/DRAIN PERITONSIL ABSCESS: ICD-10-PCS | Mod: ,,, | Performed by: OTOLARYNGOLOGY

## 2023-04-29 RX ORDER — LIDOCAINE HYDROCHLORIDE 10 MG/ML
10 INJECTION, SOLUTION EPIDURAL; INFILTRATION; INTRACAUDAL; PERINEURAL ONCE
Status: DISCONTINUED | OUTPATIENT
Start: 2023-04-29 | End: 2023-05-02 | Stop reason: HOSPADM

## 2023-04-29 RX ORDER — SODIUM CHLORIDE 0.9 % (FLUSH) 0.9 %
10 SYRINGE (ML) INJECTION
Status: DISCONTINUED | OUTPATIENT
Start: 2023-04-29 | End: 2023-05-02 | Stop reason: HOSPADM

## 2023-04-29 RX ORDER — DEXTROSE MONOHYDRATE AND SODIUM CHLORIDE 5; .45 G/100ML; G/100ML
125 INJECTION, SOLUTION INTRAVENOUS CONTINUOUS PRN
Status: DISCONTINUED | OUTPATIENT
Start: 2023-04-29 | End: 2023-05-02 | Stop reason: HOSPADM

## 2023-04-29 RX ORDER — DEXAMETHASONE SODIUM PHOSPHATE 4 MG/ML
4 INJECTION, SOLUTION INTRA-ARTICULAR; INTRALESIONAL; INTRAMUSCULAR; INTRAVENOUS; SOFT TISSUE EVERY 6 HOURS
Status: COMPLETED | OUTPATIENT
Start: 2023-04-29 | End: 2023-04-30

## 2023-04-29 RX ORDER — KETOROLAC TROMETHAMINE 30 MG/ML
10 INJECTION, SOLUTION INTRAMUSCULAR; INTRAVENOUS
Status: COMPLETED | OUTPATIENT
Start: 2023-04-29 | End: 2023-04-29

## 2023-04-29 RX ORDER — CHLORHEXIDINE GLUCONATE ORAL RINSE 1.2 MG/ML
15 SOLUTION DENTAL 3 TIMES DAILY
Status: DISCONTINUED | OUTPATIENT
Start: 2023-04-29 | End: 2023-05-02 | Stop reason: HOSPADM

## 2023-04-29 RX ORDER — LIDOCAINE HYDROCHLORIDE AND EPINEPHRINE 10; 10 MG/ML; UG/ML
10 INJECTION, SOLUTION INFILTRATION; PERINEURAL ONCE
Status: DISCONTINUED | OUTPATIENT
Start: 2023-04-29 | End: 2023-04-29

## 2023-04-29 RX ORDER — CLINDAMYCIN PHOSPHATE 600 MG/50ML
600 INJECTION, SOLUTION INTRAVENOUS
Status: DISCONTINUED | OUTPATIENT
Start: 2023-04-30 | End: 2023-05-01

## 2023-04-29 RX ORDER — ACETAMINOPHEN 325 MG/1
650 TABLET ORAL
Status: COMPLETED | OUTPATIENT
Start: 2023-04-29 | End: 2023-04-29

## 2023-04-29 RX ORDER — CLINDAMYCIN PHOSPHATE 900 MG/50ML
900 INJECTION, SOLUTION INTRAVENOUS
Status: COMPLETED | OUTPATIENT
Start: 2023-04-29 | End: 2023-04-29

## 2023-04-29 RX ORDER — DEXAMETHASONE SODIUM PHOSPHATE 4 MG/ML
12 INJECTION, SOLUTION INTRA-ARTICULAR; INTRALESIONAL; INTRAMUSCULAR; INTRAVENOUS; SOFT TISSUE
Status: COMPLETED | OUTPATIENT
Start: 2023-04-29 | End: 2023-04-29

## 2023-04-29 RX ADMIN — DEXAMETHASONE SODIUM PHOSPHATE 4 MG: 4 INJECTION, SOLUTION INTRA-ARTICULAR; INTRALESIONAL; INTRAMUSCULAR; INTRAVENOUS; SOFT TISSUE at 11:04

## 2023-04-29 RX ADMIN — POTASSIUM CHLORIDE: 2 INJECTION, SOLUTION, CONCENTRATE INTRAVENOUS at 10:04

## 2023-04-29 RX ADMIN — SODIUM CHLORIDE 1000 ML: 0.9 INJECTION, SOLUTION INTRAVENOUS at 06:04

## 2023-04-29 RX ADMIN — ACETAMINOPHEN 650 MG: 325 TABLET ORAL at 02:04

## 2023-04-29 RX ADMIN — KETOROLAC TROMETHAMINE 10 MG: 30 INJECTION, SOLUTION INTRAMUSCULAR; INTRAVENOUS at 02:04

## 2023-04-29 RX ADMIN — POTASSIUM CHLORIDE: 2 INJECTION, SOLUTION, CONCENTRATE INTRAVENOUS at 09:04

## 2023-04-29 RX ADMIN — LIDOCAINE HYDROCHLORIDE,EPINEPHRINE BITARTRATE 10 ML: 10; .01 INJECTION, SOLUTION INFILTRATION; PERINEURAL at 05:04

## 2023-04-29 RX ADMIN — CLINDAMYCIN IN 5 PERCENT DEXTROSE 900 MG: 18 INJECTION, SOLUTION INTRAVENOUS at 04:04

## 2023-04-29 RX ADMIN — INSULIN DETEMIR 10 UNITS: 100 INJECTION, SOLUTION SUBCUTANEOUS at 08:04

## 2023-04-29 RX ADMIN — IOHEXOL 75 ML: 350 INJECTION, SOLUTION INTRAVENOUS at 03:04

## 2023-04-29 RX ADMIN — CHLORHEXIDINE GLUCONATE 0.12% ORAL RINSE 15 ML: 1.2 LIQUID ORAL at 08:04

## 2023-04-29 RX ADMIN — TOPICAL ANESTHETIC: 200 SPRAY DENTAL; PERIODONTAL at 05:04

## 2023-04-29 RX ADMIN — SODIUM CHLORIDE 0.1 UNITS/KG/HR: 9 INJECTION, SOLUTION INTRAVENOUS at 08:04

## 2023-04-29 RX ADMIN — SODIUM CHLORIDE 1000 ML: 0.9 INJECTION, SOLUTION INTRAVENOUS at 05:04

## 2023-04-29 RX ADMIN — DEXAMETHASONE SODIUM PHOSPHATE 12 MG: 4 INJECTION, SOLUTION INTRA-ARTICULAR; INTRALESIONAL; INTRAMUSCULAR; INTRAVENOUS; SOFT TISSUE at 04:04

## 2023-04-29 NOTE — PROGRESS NOTES
PROCEDURE NOTE- Incision and Drainage left peritonsillar abscess    Preprocedure diagnosis:  Left Peritonsillar abscess  Postprocedure diagnosis:  Same  Findings:  Purulent drainage  Complications:  None  Blood Loss:  5 mL    PROCEDURE NOTE:  After written consent was obtained, the patient's oropharynx was anesthetized using topical hurricane spray 3 times.  The patient was given a Yankauer suction for his oral secretions.  One mL of 1% lidocaine with epinephrine was injected into the soft palate above the tonsillar pillar.  An 18 ga needle was then used to localize the area of abscess.  An 11 blade was then used to make a 1 cm incision in the patient's soft palate, and a hemostat was used to spread through the incision to fully drain the abscess.  The patient tolerated the procedure well.    Culture sent of peritonsillar abscess fluid.      Aurea Diop MD  [facility]

## 2023-04-29 NOTE — ED NOTES
Consent for I&D of peritonsillar abscess done by Dr. Diop. Pt understands risks vs benefit and agrees to procedure. This nurse witnessed consent and will assist MD during procedure.

## 2023-04-29 NOTE — LETTER
"Jonh Sebastiancecilia Figueroa was seen and treated in [facility] on 4/29-5/2/20223  He may return to work on 5/3/2023    If you have any questions or concerns, please don't hesitate to call.    Brayan Torres MD  "

## 2023-04-29 NOTE — HPI
Patient is a 33 year old AA male with history of Strep pharyngitis diagnosed 04/06/2023.  Patient was treated with amoxicillin and states that he did complete the course of therapy.  Patient then presented to the ER 04/26/2023 with feelings of weakness and generalized malaise.  He was admitted to the medicine team for DKA/newly diagnosed diabetes.  Once anion gap and sugars were under control, he was discharged from the hospital on 04/28/2023.  He was complaining of sore throat intermittently throughout his stay, which was increasing prior to discharge.  No antibiotics were given during his hospital stay.  He presented to the emergency room today complaining of left-sided throat pain, trismus, left-sided ear and upper neck pain.  He was not complaining of any significant shortness of breath, difficulty breathing, or hoarseness.  He does not have a history of recurrent tonsillar issues or previous recurrent tonsillar infections.    He was noted to have swelling of the left tonsil and peritonsillar area on exam and mild trismus.  CT scan was ordered revealing loculated areas of fluid and rim enhancing 1 cm area of peritonsillar abscess.

## 2023-04-29 NOTE — SUBJECTIVE & OBJECTIVE
"Medications:  Continuous Infusions:   insulin regular 1 units/mL infusion orderable (DKA)       Scheduled Meds:   LIDOcaine (PF) 10 mg/ml (1%)  10 mL Intradermal Once    sodium chloride 0.9%  1,000 mL Intravenous ED 1 Time     PRN Meds:benzocaine, dextrose 10%, dextrose 10%, dextrose 10%     Current Facility-Administered Medications on File Prior to Encounter   Medication    [DISCONTINUED] dextrose 10% bolus 125 mL 125 mL    [DISCONTINUED] dextrose 10% bolus 250 mL 250 mL    [DISCONTINUED] dextrose 40 % gel 15,000 mg    [DISCONTINUED] dextrose 40 % gel 30,000 mg    [DISCONTINUED] enoxaparin injection 40 mg    [DISCONTINUED] glucagon (human recombinant) injection 1 mg    [DISCONTINUED] glucagon (human recombinant) injection 1 mg    [DISCONTINUED] insulin aspart U-100 pen 1-10 Units    [DISCONTINUED] insulin detemir U-100 (Levemir) pen 15 Units    [DISCONTINUED] mupirocin 2 % ointment    [DISCONTINUED] naloxone 0.4 mg/mL injection 0.02 mg    [DISCONTINUED] sodium chloride 0.9% flush 10 mL     Current Outpatient Medications on File Prior to Encounter   Medication Sig    insulin detemir U-100, Levemir, 100 unit/mL (3 mL) SubQ InPn pen Inject 15 Units into the skin every evening.    blood sugar diagnostic Strp use as directed to check blood sugar every morning after meal and every evening    blood-glucose meter Misc use as directed    lancets 30 gauge Misc use as directed to check blood sugar every morning after meal and every evening    pen needle, diabetic 32 gauge x 5/32" Ndle use as directed to inject insulin       Review of patient's allergies indicates:  No Known Allergies    No past medical history on file.  No past surgical history on file.  Family History    None       Tobacco Use    Smoking status: Not on file    Smokeless tobacco: Not on file   Substance and Sexual Activity    Alcohol use: Not on file    Drug use: Not on file    Sexual activity: Not on file     Review of Systems  General: negative for chills " or weight loss  Psychological: negative for mood changes or depression  Ophthalmic: negative for blurry vision, photophobia or eye pain  ENT: see HPI  Respiratory: no cough, shortness of breath, or wheezing  Cardiovascular: no chest pain or dyspnea on exertion  Gastrointestinal: no abdominal pain, change in bowel habits, or black/ bloody stools  Musculoskeletal: negative for gait disturbance or muscular weakness  Neurological: no syncope or seizures; no ataxia  Dermatological: negative for pruritis,  rash, and jaundice  Hematologic/lymphatic: no easy bruising, no new adenopathy    Objective:     Vital Signs (Most Recent):  Temp: 99.6 °F (37.6 °C) (04/29/23 1132)  Pulse: 110 (04/29/23 1132)  Resp: 18 (04/29/23 1132)  BP: (!) 169/99 (04/29/23 1132)  SpO2: 98 % (04/29/23 1132)   Vital Signs (24h Range):  Temp:  [99.6 °F (37.6 °C)] 99.6 °F (37.6 °C)  Pulse:  [110] 110  Resp:  [18] 18  SpO2:  [98 %] 98 %  BP: (169)/(99) 169/99     Weight: 113.4 kg (250 lb)  Body mass index is 32.98 kg/m².      Physical Exam     Constitutional: communicating easily.  No stridor, no airway distress, voice strong.  NAD.  Eyes: EOM I Bilaterally  Head/Face: Normocephalic.  Negative paranasal sinus pressure/tenderness.  Salivary glands WNL.  House Brackmann I Bilaterally.    Right Ear: External Auditory Canal WNL,TM w/o masses/lesions/perforations.  Auricle WNL.  Left Ear: External Auditory Canal WNL,TM w/o masses/lesions/perforations. Auricle WNL.  Nose: No gross nasal septal deviation. Inferior Turbinates 3+ bilaterally. No septal perforation. No masses/lesions. External nasal skin without masses/lesions.  Oral Cavity: minimal trismus.  Gingiva/lips WNL.  FOM Soft, no masses palpated. Oral Tongue mobile. Hard Palate WNL.   Oropharynx:  Posterior oropharynx no swelling, abscess.  Soft palate with edema over uvula and diffuse tissue edema at superior tonsillar pillar.  Uvula slightly deviated to right.  Anterior tonsillar pillar not effaced.   Generalized swelling of left peritonsillar area.  + tonsillar exudates bilaterally, worse on left. + tonsillar erythema.  Cryptic tonsils bilaterally.   Neck/Lymphatic: No LAD I-VI bilaterally.  No thyromegaly.  No masses noted on exam. +TTP over left upper neck/level II area- no distinct adenopathy palpated.   Mirror laryngoscopy/nasopharyngoscopy: Active gag reflex.  Unable to perform.  Neuro/Psychiatric: AOx3.  Normal mood and affect.   Cardiovascular: Normal carotid pulses bilaterally, no increasing jugular venous distention noted at cervical region bilaterally.    Respiratory: Normal respiratory effort, no stridor, no retractions noted.      Significant Labs:  CBC:   Recent Labs   Lab 04/29/23  1422   WBC 11.96   RBC 4.92   HGB 13.7*   HCT 41.1      MCV 84   MCH 27.8   MCHC 33.3     CMP:   Recent Labs   Lab 04/28/23  0321 04/29/23  1519   *  250* 280*   CALCIUM 8.8  8.8 9.9   ALBUMIN 3.1*  --    PROT 7.2  --      137 135*   K 3.7  3.7 4.1   CO2 19*  19* 14*     105 103   BUN 11  11 12   CREATININE 1.0  1.0 1.4   ALKPHOS 75  --    ALT 11  --    AST 12  --    BILITOT 0.6  --        Significant Diagnostics:  I have reviewed and interpreted all pertinent imaging results/findings within the past 24 hours.  CT SOFT TISSUE NECK WITH CONTRAST- reviewed in detail and concur with radiologist finding.       CLINICAL HISTORY:  Tonsil/adenoid disorder;Neck abscess, deep tissue;     FINDINGS:  The visualized portions of the bilateral pulmonary upper lobes are remarkable for bilateral peripheral upper lobe emphysematous change.  The thyroid gland, parotid glands, and remaining visualized very glands are grossly unremarkable.  No findings to suggest sialolithiasis.  There is left cervical chain lymphadenopathy.  There is enlargement of the left palatine tonsil with several regions of internal loculated appearing fluid suggesting tonsillar abscess.  The largest collection measures approximately  1.7 x 1.1 cm.  Left palatine tonsillar enlargement results in mass effect upon the airway without occlusion.  There is edema about the tonsil.  The right palatine tonsil is not enlarged.  The remainder of the tonsils are grossly unremarkable in appearance.  No focal organized fluid collections elsewhere.  The bilateral globes and orbital content are unremarkable.  The paranasal sinuses and mastoid air cells are clear.  No acute displaced fracture or dislocation of the cervical spine.  The major arterial and venous vasculature of the neck is patent.  The visualized intracranial content and intracranial circulation are grossly unremarkable.     Impression:     This report was flagged in Epic as abnormal.     1. Diffuse enlargement of the left palatine tonsil noting internal rim enhancing fluid collections concerning for abscess.  Correlation and follow-up is advised.  There is leftward mass effect upon the airway without airway occlusion.  Continued follow-up is recommended.  2. Please see above for additional findings.

## 2023-04-29 NOTE — FIRST PROVIDER EVALUATION
Emergency Department TeleTriage Encounter Note      CHIEF COMPLAINT    Chief Complaint   Patient presents with    Sore Throat     Sore throat X2 days. Unable to swallow. Difficulty talking due to pain. Has had strep throat before and this feels similar.        VITAL SIGNS   Initial Vitals [04/29/23 1132]   BP Pulse Resp Temp SpO2   (!) 169/99 110 18 99.6 °F (37.6 °C) 98 %      MAP       --            ALLERGIES    Review of patient's allergies indicates:  No Known Allergies    PROVIDER TRIAGE NOTE  Patient presents with throat pain and swelling to the left side. No fever.       ORDERS  Labs Reviewed - No data to display    ED Orders (720h ago, onward)      None              Virtual Visit Note: The provider triage portion of this emergency department evaluation and documentation was performed via [employer], a HIPAA-compliant telemedicine application, in concert with a tele-presenter in the room. A face to face patient evaluation with one of my colleagues will occur once the patient is placed in an emergency department room.      DISCLAIMER: This note was prepared with [employer] voice recognition transcription software. Garbled syntax, mangled pronouns, and other bizarre constructions may be attributed to that software system.

## 2023-04-30 LAB
ANION GAP SERPL CALC-SCNC: 10 MMOL/L (ref 8–16)
ANION GAP SERPL CALC-SCNC: 12 MMOL/L (ref 8–16)
ANION GAP SERPL CALC-SCNC: 8 MMOL/L (ref 8–16)
ANION GAP SERPL CALC-SCNC: 9 MMOL/L (ref 8–16)
BASOPHILS # BLD AUTO: 0 K/UL (ref 0–0.2)
BASOPHILS NFR BLD: 0 % (ref 0–1.9)
BUN SERPL-MCNC: 10 MG/DL (ref 6–20)
BUN SERPL-MCNC: 8 MG/DL (ref 6–20)
BUN SERPL-MCNC: 8 MG/DL (ref 6–20)
BUN SERPL-MCNC: 9 MG/DL (ref 6–20)
CALCIUM SERPL-MCNC: 10.5 MG/DL (ref 8.7–10.5)
CALCIUM SERPL-MCNC: 9.5 MG/DL (ref 8.7–10.5)
CALCIUM SERPL-MCNC: 9.7 MG/DL (ref 8.7–10.5)
CALCIUM SERPL-MCNC: 9.9 MG/DL (ref 8.7–10.5)
CHLORIDE SERPL-SCNC: 105 MMOL/L (ref 95–110)
CHLORIDE SERPL-SCNC: 107 MMOL/L (ref 95–110)
CHLORIDE SERPL-SCNC: 108 MMOL/L (ref 95–110)
CHLORIDE SERPL-SCNC: 109 MMOL/L (ref 95–110)
CO2 SERPL-SCNC: 15 MMOL/L (ref 23–29)
CO2 SERPL-SCNC: 18 MMOL/L (ref 23–29)
CO2 SERPL-SCNC: 18 MMOL/L (ref 23–29)
CO2 SERPL-SCNC: 19 MMOL/L (ref 23–29)
CREAT SERPL-MCNC: 0.9 MG/DL (ref 0.5–1.4)
CREAT SERPL-MCNC: 0.9 MG/DL (ref 0.5–1.4)
CREAT SERPL-MCNC: 1 MG/DL (ref 0.5–1.4)
CREAT SERPL-MCNC: 1 MG/DL (ref 0.5–1.4)
DIFFERENTIAL METHOD: ABNORMAL
EOSINOPHIL # BLD AUTO: 0 K/UL (ref 0–0.5)
EOSINOPHIL NFR BLD: 0 % (ref 0–8)
ERYTHROCYTE [DISTWIDTH] IN BLOOD BY AUTOMATED COUNT: 12.4 % (ref 11.5–14.5)
EST. GFR  (NO RACE VARIABLE): >60 ML/MIN/1.73 M^2
GLUCOSE SERPL-MCNC: 177 MG/DL (ref 70–110)
GLUCOSE SERPL-MCNC: 234 MG/DL (ref 70–110)
GLUCOSE SERPL-MCNC: 236 MG/DL (ref 70–110)
GLUCOSE SERPL-MCNC: 249 MG/DL (ref 70–110)
HCT VFR BLD AUTO: 34.7 % (ref 40–54)
HGB BLD-MCNC: 11.8 G/DL (ref 14–18)
IMM GRANULOCYTES # BLD AUTO: 0.04 K/UL (ref 0–0.04)
IMM GRANULOCYTES NFR BLD AUTO: 0.4 % (ref 0–0.5)
LYMPHOCYTES # BLD AUTO: 1.1 K/UL (ref 1–4.8)
LYMPHOCYTES NFR BLD: 11.7 % (ref 18–48)
MCH RBC QN AUTO: 28.4 PG (ref 27–31)
MCHC RBC AUTO-ENTMCNC: 34 G/DL (ref 32–36)
MCV RBC AUTO: 84 FL (ref 82–98)
MONOCYTES # BLD AUTO: 0.4 K/UL (ref 0.3–1)
MONOCYTES NFR BLD: 3.8 % (ref 4–15)
NEUTROPHILS # BLD AUTO: 8 K/UL (ref 1.8–7.7)
NEUTROPHILS NFR BLD: 84.1 % (ref 38–73)
NRBC BLD-RTO: 0 /100 WBC
PHOSPHATE SERPL-MCNC: 2.3 MG/DL (ref 2.7–4.5)
PHOSPHATE SERPL-MCNC: 2.4 MG/DL (ref 2.7–4.5)
PHOSPHATE SERPL-MCNC: 2.5 MG/DL (ref 2.7–4.5)
PLATELET # BLD AUTO: 221 K/UL (ref 150–450)
PMV BLD AUTO: 10.8 FL (ref 9.2–12.9)
POCT GLUCOSE: 189 MG/DL (ref 70–110)
POCT GLUCOSE: 197 MG/DL (ref 70–110)
POCT GLUCOSE: 198 MG/DL (ref 70–110)
POCT GLUCOSE: 205 MG/DL (ref 70–110)
POCT GLUCOSE: 213 MG/DL (ref 70–110)
POCT GLUCOSE: 223 MG/DL (ref 70–110)
POCT GLUCOSE: 224 MG/DL (ref 70–110)
POCT GLUCOSE: 225 MG/DL (ref 70–110)
POCT GLUCOSE: 268 MG/DL (ref 70–110)
POCT GLUCOSE: 395 MG/DL (ref 70–110)
POTASSIUM SERPL-SCNC: 3.9 MMOL/L (ref 3.5–5.1)
POTASSIUM SERPL-SCNC: 4 MMOL/L (ref 3.5–5.1)
POTASSIUM SERPL-SCNC: 4 MMOL/L (ref 3.5–5.1)
POTASSIUM SERPL-SCNC: 4.2 MMOL/L (ref 3.5–5.1)
RBC # BLD AUTO: 4.15 M/UL (ref 4.6–6.2)
SODIUM SERPL-SCNC: 133 MMOL/L (ref 136–145)
SODIUM SERPL-SCNC: 134 MMOL/L (ref 136–145)
SODIUM SERPL-SCNC: 135 MMOL/L (ref 136–145)
SODIUM SERPL-SCNC: 136 MMOL/L (ref 136–145)
WBC # BLD AUTO: 9.55 K/UL (ref 3.9–12.7)

## 2023-04-30 PROCEDURE — 25000003 PHARM REV CODE 250: Performed by: STUDENT IN AN ORGANIZED HEALTH CARE EDUCATION/TRAINING PROGRAM

## 2023-04-30 PROCEDURE — 11000001 HC ACUTE MED/SURG PRIVATE ROOM

## 2023-04-30 PROCEDURE — 63600175 PHARM REV CODE 636 W HCPCS: Performed by: STUDENT IN AN ORGANIZED HEALTH CARE EDUCATION/TRAINING PROGRAM

## 2023-04-30 PROCEDURE — 94761 N-INVAS EAR/PLS OXIMETRY MLT: CPT

## 2023-04-30 PROCEDURE — 99024 POSTOP FOLLOW-UP VISIT: CPT | Mod: ,,, | Performed by: OTOLARYNGOLOGY

## 2023-04-30 PROCEDURE — 80048 BASIC METABOLIC PNL TOTAL CA: CPT | Mod: 91 | Performed by: STUDENT IN AN ORGANIZED HEALTH CARE EDUCATION/TRAINING PROGRAM

## 2023-04-30 PROCEDURE — 63600175 PHARM REV CODE 636 W HCPCS: Performed by: INTERNAL MEDICINE

## 2023-04-30 PROCEDURE — 36415 COLL VENOUS BLD VENIPUNCTURE: CPT | Performed by: PHYSICIAN ASSISTANT

## 2023-04-30 PROCEDURE — 36415 COLL VENOUS BLD VENIPUNCTURE: CPT | Performed by: STUDENT IN AN ORGANIZED HEALTH CARE EDUCATION/TRAINING PROGRAM

## 2023-04-30 PROCEDURE — 99024 PR POST-OP FOLLOW-UP VISIT: ICD-10-PCS | Mod: ,,, | Performed by: OTOLARYNGOLOGY

## 2023-04-30 PROCEDURE — 63600175 PHARM REV CODE 636 W HCPCS: Performed by: PHYSICIAN ASSISTANT

## 2023-04-30 PROCEDURE — 84100 ASSAY OF PHOSPHORUS: CPT | Mod: 91 | Performed by: PHYSICIAN ASSISTANT

## 2023-04-30 PROCEDURE — 84100 ASSAY OF PHOSPHORUS: CPT | Mod: 91 | Performed by: STUDENT IN AN ORGANIZED HEALTH CARE EDUCATION/TRAINING PROGRAM

## 2023-04-30 PROCEDURE — 25000003 PHARM REV CODE 250: Performed by: OTOLARYNGOLOGY

## 2023-04-30 PROCEDURE — 25000003 PHARM REV CODE 250: Performed by: PHYSICIAN ASSISTANT

## 2023-04-30 PROCEDURE — 25000003 PHARM REV CODE 250: Performed by: INTERNAL MEDICINE

## 2023-04-30 PROCEDURE — 85025 COMPLETE CBC W/AUTO DIFF WBC: CPT | Performed by: STUDENT IN AN ORGANIZED HEALTH CARE EDUCATION/TRAINING PROGRAM

## 2023-04-30 RX ORDER — DEXTROSE 40 %
30 GEL (GRAM) ORAL
Status: DISCONTINUED | OUTPATIENT
Start: 2023-04-30 | End: 2023-05-02 | Stop reason: HOSPADM

## 2023-04-30 RX ORDER — GLUCAGON 1 MG
1 KIT INJECTION
Status: DISCONTINUED | OUTPATIENT
Start: 2023-04-30 | End: 2023-05-02 | Stop reason: HOSPADM

## 2023-04-30 RX ORDER — INSULIN ASPART 100 [IU]/ML
1-10 INJECTION, SOLUTION INTRAVENOUS; SUBCUTANEOUS
Status: DISCONTINUED | OUTPATIENT
Start: 2023-04-30 | End: 2023-05-02 | Stop reason: HOSPADM

## 2023-04-30 RX ORDER — MUPIROCIN 20 MG/G
OINTMENT TOPICAL 2 TIMES DAILY
Status: DISCONTINUED | OUTPATIENT
Start: 2023-04-30 | End: 2023-05-02 | Stop reason: HOSPADM

## 2023-04-30 RX ORDER — DEXTROSE 40 %
15 GEL (GRAM) ORAL
Status: DISCONTINUED | OUTPATIENT
Start: 2023-04-30 | End: 2023-05-02 | Stop reason: HOSPADM

## 2023-04-30 RX ADMIN — INSULIN ASPART 4 UNITS: 100 INJECTION, SOLUTION INTRAVENOUS; SUBCUTANEOUS at 11:04

## 2023-04-30 RX ADMIN — POTASSIUM CHLORIDE: 2 INJECTION, SOLUTION, CONCENTRATE INTRAVENOUS at 03:04

## 2023-04-30 RX ADMIN — DEXAMETHASONE SODIUM PHOSPHATE 4 MG: 4 INJECTION, SOLUTION INTRA-ARTICULAR; INTRALESIONAL; INTRAMUSCULAR; INTRAVENOUS; SOFT TISSUE at 06:04

## 2023-04-30 RX ADMIN — CLINDAMYCIN IN 5 PERCENT DEXTROSE 600 MG: 12 INJECTION, SOLUTION INTRAVENOUS at 05:04

## 2023-04-30 RX ADMIN — CLINDAMYCIN IN 5 PERCENT DEXTROSE 600 MG: 12 INJECTION, SOLUTION INTRAVENOUS at 08:04

## 2023-04-30 RX ADMIN — POTASSIUM CHLORIDE: 2 INJECTION, SOLUTION, CONCENTRATE INTRAVENOUS at 08:04

## 2023-04-30 RX ADMIN — CHLORHEXIDINE GLUCONATE 0.12% ORAL RINSE 15 ML: 1.2 LIQUID ORAL at 09:04

## 2023-04-30 RX ADMIN — INSULIN ASPART 5 UNITS: 100 INJECTION, SOLUTION INTRAVENOUS; SUBCUTANEOUS at 09:04

## 2023-04-30 RX ADMIN — DEXAMETHASONE SODIUM PHOSPHATE 4 MG: 4 INJECTION, SOLUTION INTRA-ARTICULAR; INTRALESIONAL; INTRAMUSCULAR; INTRAVENOUS; SOFT TISSUE at 11:04

## 2023-04-30 RX ADMIN — INSULIN ASPART 6 UNITS: 100 INJECTION, SOLUTION INTRAVENOUS; SUBCUTANEOUS at 05:04

## 2023-04-30 RX ADMIN — MUPIROCIN: 20 OINTMENT TOPICAL at 09:04

## 2023-04-30 RX ADMIN — CHLORHEXIDINE GLUCONATE 0.12% ORAL RINSE 15 ML: 1.2 LIQUID ORAL at 08:04

## 2023-04-30 RX ADMIN — SODIUM CHLORIDE 0.05 UNITS/KG/HR: 9 INJECTION, SOLUTION INTRAVENOUS at 05:04

## 2023-04-30 RX ADMIN — INSULIN DETEMIR 13 UNITS: 100 INJECTION, SOLUTION SUBCUTANEOUS at 09:04

## 2023-04-30 RX ADMIN — CLINDAMYCIN IN 5 PERCENT DEXTROSE 600 MG: 12 INJECTION, SOLUTION INTRAVENOUS at 01:04

## 2023-04-30 NOTE — H&P
[facility] Medicine H&P Note     Admitting Team: U Hospitalist Team B  Attending Physician: Dr. Johnson  Resident: zeke   Intern: alistair     Date of Admit: 4/29/2023    Chief Complaint     Sore Throat (Sore throat X2 days. Unable to swallow. Difficulty talking due to pain. Has had strep throat before and this feels similar. )   for 2 day     Subjective:      History of Present Illness:  Jonh Figueroa is a 33 y.o. new onset DM uncontrolled w/o complications on insulin, normocytic anemia, suspected flow murmur, presents for sore throat 2 days. The patient presented to [facility] on 4/29/2023 with a primary complaint of Sore Throat (Sore throat X2 days. Unable to swallow. Difficulty talking due to pain. Has had strep throat before and this feels similar. )      The patient recently discharged with 15U LA insulin for new onset DKA and DM diagnosis on 4/28/23. Pt states he noted some sore throat pain a day or so prior to discharge but thought nothing of it. He presented today 2/2 increased pain to left throat/ear/neck that was painful with solids. He was unable to eat day of presentation. He has had strep throat in the past and it felt similar to this. He has never had any tonsillar abscess and has not taken abx recently. He denies any SOB, difficulty breathing, hoarseness, hematemesis, cough. He has used his insulin since discharge and states his AM glucose was still in the mid 200's. ENT evaluated and drained peritonsillar abscess in the ED after reviewing CT findings. ENT recc clinda and steroids, but pts labs concerning for DKA similar to prior presentation. U Med consulted for DKA management and further glycemic regimen while on steroids.     Past Medical History:  New onset DM  Normocytic anemia   Flow murmur     Past Surgical History:  none    Allergies:  Review of patient's allergies indicates:  No Known Allergies    Home Medications:  Prior to Admission medications    Medication Sig  "Start Date End Date Taking? Authorizing Provider   insulin detemir U-100, Levemir, 100 unit/mL (3 mL) SubQ InPn pen Inject 15 Units into the skin every evening. 23  Yes    blood sugar diagnostic Strp use as directed to check blood sugar every morning after meal and every evening 23      blood-glucose meter Misc use as directed 23      lancets 30 gauge Misc use as directed to check blood sugar every morning after meal and every evening 23      pen needle, diabetic 32 gauge x 5/32" Ndle use as directed to inject insulin 23          Family History:  Mother - DM   Father - HTN     Social History:   Smoking - denies  EtOH - occassional   Illicits - denies     Review of Systems:  Pertinent items are noted in HPI. All other systems are reviewed and are negative. Pt denies CP, HA, SOB, abd pain, hematuria, melena, hematochezia.     Health Maintaince :   Primary Care Physician: none    Immunizations:   1 covid     Cancer Screening:  Not indicated yet        Objective:   Last 24 Hour Vital Signs:  BP  Min: 152/79  Max: 169/99  Temp  Av.6 °F (37.6 °C)  Min: 99.6 °F (37.6 °C)  Max: 99.6 °F (37.6 °C)  Pulse  Av.5  Min: 97  Max: 110  Resp  Av  Min: 18  Max: 18  SpO2  Av.5 %  Min: 98 %  Max: 99 %  Height  Av' 1" (185.4 cm)  Min: 6' 1" (185.4 cm)  Max: 6' 1" (185.4 cm)  Weight  Av.4 kg (250 lb)  Min: 113.4 kg (250 lb)  Max: 113.4 kg (250 lb)  Body mass index is 32.98 kg/m².  No intake/output data recorded.    Physical Examination:  General: Alert, no acute distress, conversant without increased work of breathing  Head: Normocephalic, atraumatic  Eyes: EOMI, PERRL, conjunctiva normal  Throat: Normal mucosa, left pharynx with lanced tonsillar abscess, non bleeding, exudates noted on the left,   Neck: Supple, no carotid bruit bilaterally, no LAD   Cardiovascular: RRR, S1/S2, no MRG  Chest: CTAB, no rales, rhonchi, wheeze, unlabored breathing  Abdomen: Soft, nondistended, " nontender, NABS  Extremities: No cyanosis, clubbing, edema, no lesions  Pulses: 2+ in all extremities  Neurologic: Aox4, moving all extremities w/o issue full strength, sensation throughout      Laboratory:  Most Recent Data:  CBC:   Lab Results   Component Value Date    WBC 11.96 04/29/2023    HGB 13.7 (L) 04/29/2023    HCT 41.1 04/29/2023     04/29/2023    MCV 84 04/29/2023    RDW 12.7 04/29/2023     WBC Differential: 75 % N, 0.04 % Bands, 17 % L, 7.5 % M, 0 % Eo, 0.3 % Baso, 0 additional cells seen  BMP:   Lab Results   Component Value Date     04/29/2023    K 4.1 04/29/2023     04/29/2023    CO2 12 (L) 04/29/2023    BUN 12 04/29/2023    CREATININE 1.3 04/29/2023     (H) 04/29/2023    CALCIUM 9.5 04/29/2023    MG 1.8 04/29/2023    PHOS 3.4 04/29/2023    PHOS 3.3 04/29/2023     LFTs:   Lab Results   Component Value Date    PROT 7.2 04/28/2023    ALBUMIN 3.1 (L) 04/28/2023    BILITOT 0.6 04/28/2023    AST 12 04/28/2023    ALKPHOS 75 04/28/2023    ALT 11 04/28/2023     Coags: No results found for: INR, PROTIME, PTT  FLP:   Lab Results   Component Value Date    CHOL 150 04/26/2023    HDL 39 (L) 04/26/2023    LDLCALC 82.2 04/26/2023    TRIG 144 04/26/2023    CHOLHDL 26.0 04/26/2023     DM:   Lab Results   Component Value Date    HGBA1C 11.3 (H) 04/26/2023    LDLCALC 82.2 04/26/2023    CREATININE 1.3 04/29/2023     Thyroid:   Lab Results   Component Value Date    TSH 2.861 04/26/2023     Anemia:   Lab Results   Component Value Date    IRON 69 04/28/2023    TIBC 222 (L) 04/28/2023    FERRITIN 495 (H) 04/28/2023    [id number] 862 04/28/2023    FOLATE 11.0 04/28/2023     Cardiac:   Lab Results   Component Value Date    TROPONINI <0.006 04/26/2023    BNP <10 04/26/2023     Urinalysis: No results found for: LABURIN, COLORU, PHUA, CLARITYU, SPECGRAV, LABSPEC, NITRITE, PROTEINUR, GLUCOSEU, KETONESU, UROBILINOGEN, BILIRUBINUR, BLOODU, RBCU, WBCUA    Trended Lab Data:  Recent Labs   Lab 04/26/23  1611  04/26/23 2019 04/27/23  0439 04/27/23  0757 04/28/23  0321 04/29/23  1422 04/29/23  1519 04/29/23  1837   WBC 8.68  --  10.02  --  9.01 11.96  --   --    HGB 13.6*  --  11.3*  --  12.2* 13.7*  --   --    HCT 40.8  --  34.1*  --  37.2* 41.1  --   --      --  217  --  212 211  --   --    MCV 83  --  84  --  84 84  --   --    RDW 12.0  --  12.0  --  12.1 12.7  --   --    *   < > 135*  135*  135*   < > 137  137  --  135* 136   K 4.3   < > 3.7  3.7  3.7   < > 3.7  3.7  --  4.1 4.1      < > 107  107  107   < > 105  105  --  103 107   CO2 14*   < > 17*  17*  17*   < > 19*  19*  --  14* 12*   BUN 14   < > 9  9  9   < > 11  11  --  12 12   CREATININE 1.4   < > 1.0  1.0  1.0   < > 1.0  1.0  --  1.4 1.3   *   < > 323*  323*  323*   < > 250*  250*  --  280* 280*   PROT 8.4  --  6.8  --  7.2  --   --   --    ALBUMIN 3.6  --  2.9*  --  3.1*  --   --   --    BILITOT 0.3  --  0.5  --  0.6  --   --   --    AST 12  --  10  --  12  --   --   --    ALKPHOS 108  --  70  --  75  --   --   --    ALT 14  --  11  --  11  --   --   --     < > = values in this interval not displayed.       Trended Cardiac Data:  Recent Labs   Lab 04/26/23  1611   TROPONINI <0.006   BNP <10       Microbiology Data:  Culture pending     Other Results:  EKG pending     Radiology:  Imaging Results              X-Ray Chest AP Portable (No Result on File)                       CT Soft Tissue Neck With Contrast (Final result)  Result time 04/29/23 15:41:35      Final result by Brad Ponce MD (04/29/23 15:41:35)                   Impression:      This report was flagged in Epic as abnormal.    1. Diffuse enlargement of the left palatine tonsil noting internal rim enhancing fluid collections concerning for abscess.  Correlation and follow-up is advised.  There is leftward mass effect upon the airway without airway occlusion.  Continued follow-up is recommended.  2. Please see above for additional  findings.      Electronically signed by: Brad Ponce MD  Date:    04/29/2023  Time:    15:41               Narrative:    EXAMINATION:  CT SOFT TISSUE NECK WITH CONTRAST    CLINICAL HISTORY:  Tonsil/adenoid disorder;Neck abscess, deep tissue;    TECHNIQUE:  Low dose axial images as well as sagittal and coronal reconstructions were performed from the skull base to the clavicles following the intravenous administration of 75 mL of Omnipaque 350.    COMPARISON:  None    FINDINGS:  The visualized portions of the bilateral pulmonary upper lobes are remarkable for bilateral peripheral upper lobe emphysematous change.  The thyroid gland, parotid glands, and remaining visualized very glands are grossly unremarkable.  No findings to suggest sialolithiasis.  There is left cervical chain lymphadenopathy.  There is enlargement of the left palatine tonsil with several regions of internal loculated appearing fluid suggesting tonsillar abscess.  The largest collection measures approximately 1.7 x 1.1 cm.  Left palatine tonsillar enlargement results in mass effect upon the airway without occlusion.  There is edema about the tonsil.  The right palatine tonsil is not enlarged.  The remainder of the tonsils are grossly unremarkable in appearance.  No focal organized fluid collections elsewhere.  The bilateral globes and orbital content are unremarkable.  The paranasal sinuses and mastoid air cells are clear.  No acute displaced fracture or dislocation of the cervical spine.  The major arterial and venous vasculature of the neck is patent.  The visualized intracranial content and intracranial circulation are grossly unremarkable.                                           Assessment:     Jonh Figueroa is a 33 y.o. male with: new onset DM uncontrolled w/o complications on insulin, normocytic anemia, suspected flow murmur, presents for sore throat 2 days. Pt's work up was notable for pH 7.31, BetaOH - 4.7, AG -18, with hyperglycemia  280, likely 2/2 peritonsillar abscess that was drained by ENT. Pt admitted to [facility] and placed in ICU for insulin gtt and continued monitoring/IV clinda for tonsillar abscess.       Patient Active Problem List    Diagnosis Date Noted    Peritonsillar abscess 04/29/2023    Pharyngitis due to Streptococcus species 04/29/2023    Tonsillitis 04/29/2023    DKA (diabetic ketoacidosis) 04/26/2023          Plan:     DKA , DM   - A1c 11, pt administered 15U LA last night, pending PCP follow up for oral medications  - pH 7.3, AG 18, betaOH 4.7, and known infection, LA 1.2, CO2 14, Na 135  - pt started on insulin gtt, LR Kcl 30meq @250ml,   - given LA 10U   - BMP q4   - K-4.2   - cont to monitor, pt NPO 2/2 I&D of peritonsillar abscess     Lt Peritonsillar abscess, strep tonsillitis/pharyngitis   - CT - 1.7x1.1 left tonsillar abscess    - ENT drained abscess   - cont clinda IV 600mg q8   - given dexameth 12mg and continue taper per ENT 4mg q6 x 3 doses   - F/U Wound cx   - will monitor need for pain medication PRN     Normocytic Anemia  -H/H 12.2/37.2 MCV 84 on 4/28  - continue to monitor     HCM   Pt needs vacc Prevnar 20, tdap, flu, covid     Code Status:     Full     Aric Camacho MD  [facility] Internal Medicine HO-3    [facility] Medicine Hospitalist Pager numbers:   U Hospitalist Medicine Team A (Donna/Cordell): [phone]  [facility] Hospitalist Medicine Team B (Tiana/Gabrielle):  [phone]

## 2023-04-30 NOTE — ED NOTES
Report given to HARMAN Noguera. Patient ambulated to ED 14. Patient awake, alert, oriented x 4. Speech clear. Tolerating own secretions.

## 2023-04-30 NOTE — ED NOTES
Dr. Mckinnon notified of fluctuating glucose results due to IV steroid administration and requiring repeated titration of insulin gtt.rate. Notified of CBG =223, which is increased from previous rate of 197. Will transport to ICU. Orders received to continue infusion of D5 LR with 30 Meq KCL

## 2023-04-30 NOTE — PLAN OF CARE
Received patient from ED at 0334, on RA with insulin drip running at 0.1 units/kg/h .  Pt laying in bed, in no distress, AAOx4.  Bed at lowest position, personal items & call light within.     Problem: Diabetic Ketoacidosis  Goal: Fluid and Electrolyte Balance with Absence of Ketosis  Outcome: Ongoing, Progressing     Problem: Adult Inpatient Plan of Care  Goal: Plan of Care Review  Outcome: Ongoing, Progressing  Goal: Patient-Specific Goal (Individualized)  Outcome: Ongoing, Progressing  Goal: Absence of Hospital-Acquired Illness or Injury  Outcome: Ongoing, Progressing  Goal: Optimal Comfort and Wellbeing  Outcome: Ongoing, Progressing  Goal: Readiness for Transition of Care  Outcome: Ongoing, Progressing     Problem: Diabetes Comorbidity  Goal: Blood Glucose Level Within Targeted Range  Outcome: Ongoing, Progressing

## 2023-04-30 NOTE — EICU
eICU Physician Brief Note    Chart reviewed. On camera, the patient is asleep in bed, in NAD.  Mr Jonh Figueroa is a 33 year old man presenting with sore throat leading to difficulty swallowing. Recent admission for DKA. Workup in ED showed recurrent DKA as well as a peritonsillar abscess which was drained by ENT.  PMH: DM2 poorly controlled, on insulin; anemia.   Labs and investigations reviewed.  Current medications reviewed.  A/P:  1. DKA  Triggered by infection.  Insulin drip per protocol, until bicarb level >20 (to aviod relapse).  Monitor electroytes and replace prn.  2. Left peritonsillar abscess.  Will be followed by ENT after drainage.  Started on Clindamycin and Dexamethasone.  Follow cutlures and adjsut Abx accordingly.  3. Normocytic anemia  This is unausual in a young man.  Needs further workup if not already done.  4. GI/DVT prophylaxis.    eICU is available should acute issues arise.      
Dr Brown PGY4

## 2023-04-30 NOTE — PROVIDER PROGRESS NOTES - EMERGENCY DEPT.
Encounter Date: 4/29/2023    ED Physician Progress Notes            Critical Care    Date/Time: 4/29/2023 6:00 PM  Performed by: Solis Neely MD  Authorized by: Solis Neely MD   Total critical care time (exclusive of procedural time) : 45 minutes  Critical care was necessary to treat or prevent imminent or life-threatening deterioration of the following conditions: endocrine crisis and dehydration (DKA).

## 2023-04-30 NOTE — PROGRESS NOTES
[facility] - Intensive Care  Otorhinolaryngology-Head & Neck Surgery  Progress Note    Patient Name: Jonh Figueroa  MRN: [id number]  Code Status: Full Code  Admission Date: 4/29/2023  Hospital Length of Stay: 1 days  Attending Physician: Alvaro Anthony MD  Primary Care Provider: Primary Doctor No    Subjective:     Interval History: No events overnight per pt or nurse.  On insulin gtt for DKA/DM.  BS still elevated.  Throat pain improved, no worsening of swallow, changes in respiration/voice, still with mild trismus, but overall improved.  Still mild pain left jaw/upper neck.      Post-Procedure Info:  Day 1 s/p I&D left PTA     Hospital Day: 2      Medications:  Continuous Infusions:   dextrose 5 % and 0.45 % NaCl      insulin regular 1 units/mL infusion orderable (DKA) 0.05 Units/kg/hr (04/30/23 0817)    potassium chloride maintenance fluid 250 mL/hr at 04/30/23 0700    potassium chloride maintenance fluid Stopped (04/29/23 2243)     Scheduled Meds:   chlorhexidine  15 mL Mouth/Throat TID    clindamycin (CLEOCIN) IVPB  600 mg Intravenous Q8H    dexAMETHasone  4 mg Intravenous Q6H    LIDOcaine (PF) 10 mg/ml (1%)  10 mL Intradermal Once     PRN Meds:benzocaine, dextrose 10%, dextrose 10%, dextrose 10%, dextrose 5 % and 0.45 % NaCl, sodium chloride 0.9%     Objective:     Vital Signs (24h Range):  Temp:  [97.9 °F (36.6 °C)-99.6 °F (37.6 °C)] 98.6 °F (37 °C)  Pulse:  [] 72  Resp:  [13-41] 14  SpO2:  [97 %-100 %] 98 %  BP: (115-169)/(61-99) 133/76     Date 04/30/23 0700 - 05/01/23 0659   Shift 1098-7336 1664-3264 3825-3524 24 Hour Total   INTAKE   I.V.(mL/kg) 235(2.1)   235(2.1)   Shift Total(mL/kg) 235(2.1)   235(2.1)   OUTPUT   Shift Total(mL/kg)       Weight (kg) 113.4 113.4 113.4 113.4     Lines/Drains/Airways       Peripheral Intravenous Line  Duration                  Peripheral IV - Single Lumen 04/29/23 18 G Right Upper Arm 1 day         Peripheral IV - Single Lumen 04/29/23 1423 20 G Right Hand <1 day          Peripheral IV - Single Lumen 04/30/23 0150 20 G Left;Posterior Hand <1 day                    Physical Exam  HENT:      Head: Normocephalic and atraumatic. No masses.      Jaw: Trismus (very mild, pain over left jaw with opening) present.      Salivary Glands: Right salivary gland is not diffusely enlarged or tender. Left salivary gland is not diffusely enlarged or tender.      Right Ear: Tympanic membrane, ear canal and external ear normal.      Left Ear: Tympanic membrane, ear canal and external ear normal.      Nose: Mucosal edema (mild) present. No nasal deformity, septal deviation, congestion or rhinorrhea.      Right Nostril: No epistaxis.      Left Nostril: No epistaxis.      Right Turbinates: Not enlarged or swollen.      Left Turbinates: Not enlarged or swollen.      Right Sinus: No maxillary sinus tenderness or frontal sinus tenderness.      Left Sinus: No maxillary sinus tenderness or frontal sinus tenderness.      Mouth/Throat:      Lips: Pink.      Mouth: Mucous membranes are moist.      Dentition: Normal dentition. No gum lesions.      Tongue: No lesions. Tongue does not deviate from midline.      Palate: No mass.      Pharynx: Pharyngeal swelling (swelling over left tonsil, improved; no effacement of tonsillar pillar), posterior oropharyngeal erythema (mild) and uvula swelling (> on left, uvula more midline today; tissue edema improved) present.      Tonsils: Tonsillar abscess (s/p needle drainage and I&D on left; no worsening of swelling, erythema, exudates improved; no bleeding) present. 1+ on the right. 2+ on the left.       Significant Labs:  BMP:   Recent Labs   Lab 04/29/23  1837 04/29/23 1956 04/30/23  0638   *   < > 234*      < > 107   CO2 12*   < > 18*   BUN 12   < > 8   CREATININE 1.3   < > 0.9   CALCIUM 9.5   < > 9.7   MG 1.8  --   --     < > = values in this interval not displayed.     CBC:   Recent Labs   Lab 04/30/23  0638   WBC 9.55   RBC 4.15*   HGB 11.8*   HCT 34.7*       MCV 84   MCH 28.4   MCHC 34.0       Significant Diagnostics:  No new imaging    Assessment/Plan:     Active Diagnoses:    Diagnosis Date Noted POA    Peritonsillar abscess [J36] 04/29/2023 Yes    Pharyngitis due to Streptococcus species [J02.0] 04/29/2023 Yes    Tonsillitis [J03.90] 04/29/2023 Yes    DKA (diabetic ketoacidosis) [E11.10] 04/26/2023 Yes      Problems Resolved During this Admission:     Continues to improve.    Continue clindamycin and steroid taper.    Medicine team managing DKA/DM.    Chlorhexidine rinses.    Ok for PO intake today if primary team agrees.  Soft diet.     Will continue to follow.      Aurea Diop MD  Otorhinolaryngology-Head & Neck Surgery  [facility] - Intensive Care

## 2023-04-30 NOTE — PROGRESS NOTES
"[facility] Medicine Progress Note    Primary Team: [facility] Hospitalist Team B  Attending Physician: Alvaro Anthony MD  Resident: Brian  Intern: [facility] Day: 1 days    Chief Complaint: Sore throat for 2 days    Subjective:      Patient seen and examined by me this morning. Patient reports feeling "fine" and has been trying to sleep. He is ready to try eating.         Objective:   Last 24 Hour Vital Signs:  BP  Min: 110/71  Max: 169/99  Temp  Av.4 °F (36.9 °C)  Min: 97.8 °F (36.6 °C)  Max: 99.6 °F (37.6 °C)  Pulse  Av.3  Min: 72  Max: 110  Resp  Av.9  Min: 13  Max: 41  SpO2  Av.4 %  Min: 97 %  Max: 100 %  Height  Av' 1" (185.4 cm)  Min: 6' 0.99" (185.4 cm)  Max: 6' 1" (185.4 cm)  Weight  Av.4 kg (249 lb 15.8 oz)  Min: 113.4 kg (249 lb 15.7 oz)  Max: 113.4 kg (250 lb)    Intake/Output Summary (Last 24 hours) at 2023 0919  Last data filed at 2023 0700  Gross per 24 hour   Intake 4245.72 ml   Output 1600 ml   Net 2645.72 ml      Physical Examination:  Physical Exam  Constitutional:       Appearance: Normal appearance.   HENT:      Head: Normocephalic and atraumatic.      Mouth/Throat:      Comments: left pharynx with lanced tonsillar abscess, no bleeding present  Eyes:      Extraocular Movements: Extraocular movements intact.   Cardiovascular:      Rate and Rhythm: Normal rate and regular rhythm.      Heart sounds: Normal heart sounds.   Pulmonary:      Effort: Pulmonary effort is normal.      Breath sounds: Normal breath sounds.   Skin:     General: Skin is warm.   Neurological:      Mental Status: He is alert and oriented to person, place, and time. Mental status is at baseline.   Psychiatric:         Mood and Affect: Mood normal.         Behavior: Behavior normal.      Laboratory:  Recent Labs   Lab 23  1611 23  2019 23  0439 23  0757 23  0321 23  1422 23  1519 23  2219 23  0303 23  0638   WBC 8.68  --  " 10.02  --  9.01 11.96  --   --   --  9.55   HGB 13.6*  --  11.3*  --  12.2* 13.7*  --   --   --  11.8*   HCT 40.8  --  34.1*  --  37.2* 41.1  --   --   --  34.7*     --  217  --  212 211  --   --   --  221   MCV 83  --  84  --  84 84  --   --   --  84   RDW 12.0  --  12.0  --  12.1 12.7  --   --   --  12.4   *   < > 135*  135*  135*   < > 137  137  --    < > 136 134* 133*   K 4.3   < > 3.7  3.7  3.7   < > 3.7  3.7  --    < > 3.8 3.9 4.0      < > 107  107  107   < > 105  105  --    < > 109 109 107   CO2 14*   < > 17*  17*  17*   < > 19*  19*  --    < > 12* 15* 18*   BUN 14   < > 9  9  9   < > 11  11  --    < > 12 10 8   CREATININE 1.4   < > 1.0  1.0  1.0   < > 1.0  1.0  --    < > 1.1 1.0 0.9   *   < > 323*  323*  323*   < > 250*  250*  --    < > 211* 236* 234*   PROT 8.4  --  6.8  --  7.2  --   --   --   --   --    ALBUMIN 3.6  --  2.9*  --  3.1*  --   --   --   --   --    BILITOT 0.3  --  0.5  --  0.6  --   --   --   --   --    AST 12  --  10  --  12  --   --   --   --   --    ALKPHOS 108  --  70  --  75  --   --   --   --   --    ALT 14  --  11  --  11  --   --   --   --   --     < > = values in this interval not displayed.     Microbiology Results (last 7 days)       Procedure Component Value Units Date/Time    Aerobic culture (Specify Source) **CANNOT BE ORDERED AS STAT** [[id number]] Collected: 04/29/23 1842    Order Status: Sent Specimen: Abscess from Tonsil, Left Updated: 04/30/23 0053    Culture, Anaerobe [[id number]] Collected: 04/29/23 1842    Order Status: Sent Specimen: Abscess from Tonsil, Left Updated: 04/30/23 0053    Culture, Anaerobe [[id number]] Collected: 04/29/23 2305    Order Status: Sent Specimen: Abscess from Tonsil, Left Updated: 04/30/23 0053    Aerobic culture [[id number]] Collected: 04/29/23 2305    Order Status: Sent Specimen: Abscess from Tonsil, Left Updated: 04/30/23 0053    Group A Strep, Molecular [[id number]] Collected: 04/29/23 1251    Order  Status: Completed Specimen: Throat Updated: 04/29/23 1311     Group A Strep, Molecular Negative     Comment: Arcanobacterium haemolyticum and Beta Streptococcus group C   and G will not be detected by this test method.  Please order   Throat Culture ([id number]) if suspected.                  I have personally reviewed the above laboratory studies.     Imaging:  EKG (my interpretation): normal sinus rhythm    X-Ray Chest PA And Lateral    Result Date: 4/26/2023  EXAMINATION: XR CHEST PA AND LATERAL TECHNIQUE: PA and lateral views of the chest were performed. COMPARISON: None FINDINGS: The cardiac silhouette is normal in size, midline. The pulmonary vascularity is normal. The pulmonary waqas appear normal bilaterally. The lungs are well expanded and clear. No focal airspace disease. No pleural effusion, no pneumothorax. The osseous structures appear within normal limits for age.     No significant abnormality seen. Electronically signed by: Lauren Downing MD Date:    04/26/2023 Time:    16:33    CT Soft Tissue Neck With Contrast    Result Date: 4/29/2023  EXAMINATION: CT SOFT TISSUE NECK WITH CONTRAST CLINICAL HISTORY: Tonsil/adenoid disorder;Neck abscess, deep tissue; TECHNIQUE: Low dose axial images as well as sagittal and coronal reconstructions were performed from the skull base to the clavicles following the intravenous administration of 75 mL of Omnipaque 350. COMPARISON: None FINDINGS: The visualized portions of the bilateral pulmonary upper lobes are remarkable for bilateral peripheral upper lobe emphysematous change.  The thyroid gland, parotid glands, and remaining visualized very glands are grossly unremarkable.  No findings to suggest sialolithiasis.  There is left cervical chain lymphadenopathy.  There is enlargement of the left palatine tonsil with several regions of internal loculated appearing fluid suggesting tonsillar abscess.  The largest collection measures approximately 1.7 x 1.1 cm.  Left  palatine tonsillar enlargement results in mass effect upon the airway without occlusion.  There is edema about the tonsil.  The right palatine tonsil is not enlarged.  The remainder of the tonsils are grossly unremarkable in appearance.  No focal organized fluid collections elsewhere.  The bilateral globes and orbital content are unremarkable.  The paranasal sinuses and mastoid air cells are clear.  No acute displaced fracture or dislocation of the cervical spine.  The major arterial and venous vasculature of the neck is patent.  The visualized intracranial content and intracranial circulation are grossly unremarkable.     This report was flagged in Epic as abnormal. 1. Diffuse enlargement of the left palatine tonsil noting internal rim enhancing fluid collections concerning for abscess.  Correlation and follow-up is advised.  There is leftward mass effect upon the airway without airway occlusion.  Continued follow-up is recommended. 2. Please see above for additional findings. Electronically signed by: Brad Ponce MD Date:    04/29/2023 Time:    15:41    X-Ray Chest AP Portable    Result Date: 4/29/2023  EXAMINATION: XR CHEST AP PORTABLE CLINICAL HISTORY: DKA; TECHNIQUE: Single frontal view of the chest was performed. COMPARISON: 04/26/2023. FINDINGS: The lungs are well expanded and clear. No focal opacities are seen. The pleural spaces are clear. The cardiac silhouette is unremarkable. The visualized osseous structures are unremarkable.     No acute abnormality. Electronically signed by: Giacomo Shin Date:    04/29/2023 Time:    20:51    Echo    Result Date: 4/28/2023  · The left ventricle is normal in size with concentric remodeling and normal systolic function. · The estimated ejection fraction is 55%. · Normal left ventricular diastolic function. · Normal right ventricular size with normal right ventricular systolic function. · The estimated PA systolic pressure is 24 mmHg. · Normal central venous pressure  (3 mmHg).         Current Medications:     Scheduled:   chlorhexidine  15 mL Mouth/Throat TID    clindamycin (CLEOCIN) IVPB  600 mg Intravenous Q8H    dexAMETHasone  4 mg Intravenous Q6H    LIDOcaine (PF) 10 mg/ml (1%)  10 mL Intradermal Once         Infusions:   dextrose 5 % and 0.45 % NaCl      insulin regular 1 units/mL infusion orderable (DKA) 0.05 Units/kg/hr (04/30/23 0900)    potassium chloride maintenance fluid 250 mL/hr at 04/30/23 0857    potassium chloride maintenance fluid Stopped (04/29/23 2243)        PRN:  benzocaine, dextrose 10%, dextrose 10%, dextrose 10%, dextrose 5 % and 0.45 % NaCl, sodium chloride 0.9%  Antibiotics and Day Number of Therapy:  Antibiotics (From admission, onward)      Start     Stop Route Frequency Ordered    04/30/23 0100  clindamycin in D5W 600 mg/50 mL IVPB 600 mg         -- IV Every 8 hours (non-standard times) 04/29/23 2024          Lines and Day Number of Therapy:  PIV       Assessment:     Jonh Figueroa is a 33 y.o. male with: new onset DM uncontrolled w/o complications on insulin, normocytic anemia, suspected flow murmur, presents for sore throat 2 days. Pt's work up was notable for pH 7.31, BetaOH - 4.7, AG -18, with hyperglycemia 280, likely 2/2 peritonsillar abscess that was drained by ENT. Pt admitted to [facility] and placed in ICU for insulin gtt and continued monitoring/IV clinda for tonsillar abscess.      Plan:     DKA , DM   - A1c 11, pt administered 15U LA last night, pending PCP follow up for oral medications  - pH 7.3, AG 18, betaOH 4.7, and known infection, LA 1.2, CO2 14, Na 135  - pt started on insulin gtt, LR Kcl 30meq @250ml,   - given LA 10U   - BMP q4   - K-4.2 on 4/29  - cont to monitor, diabetic diet started today    Lt Peritonsillar abscess, strep tonsillitis/pharyngitis   - CT - 1.7x1.1 left tonsillar abscess    - ENT drained abscess   - cont clinda IV 600mg q8   - given dexameth 12mg and continue taper per ENT 4mg q6 x 3 doses  - Chlorhexidine rinses    - F/U Wound cx   - will monitor need for pain medication PRN      Normocytic Anemia  -H/H 12.2/37.2 MCV 84 on 4/28  - continue to monitor      HCM   Pt needs vacc Prevnar 20, tdap, flu, covid     Diet: Diabetic; soft  Code: FULL      La Shin M.D.  [facility] Internal Medicine PGY-1    [facility] Medicine Hospitalist Pager numbers:   [facility] Hospitalist Medicine Team A (Donna/Cordell): [phone]  [facility] Hospitalist Medicine Team B (Tiana/Gabrielle):  [phone]

## 2023-04-30 NOTE — ED PROVIDER NOTES
Encounter Date: 4/29/2023       History     Chief Complaint   Patient presents with    Sore Throat     Sore throat X2 days. Unable to swallow. Difficulty talking due to pain. Has had strep throat before and this feels similar.      33-year-old male with history of recently diagnosed diabetes presents to the ER with chief complaint of sore throat, difficulty swallowing and talking due to pain in his throat.  Patient reports that his pain began 2 days ago.  Patient was discharged from the hospital yesterday after an admission for DKA.  He was sent home with prescription for long-acting insulin.  Patient denies fever, chills, vomiting, shortness of breath, or additional complaints at this time.      Review of patient's allergies indicates:  No Known Allergies  No past medical history on file.  No past surgical history on file.  No family history on file.     Review of Systems   Constitutional:  Negative for chills and fever.   HENT:  Positive for sore throat, trouble swallowing (painful swallowing) and voice change.    Respiratory:  Negative for shortness of breath.    Cardiovascular:  Negative for chest pain.   Gastrointestinal:  Negative for abdominal pain, nausea and vomiting.   Genitourinary:  Negative for dysuria.   Musculoskeletal:  Positive for neck pain. Negative for back pain.   Skin:  Negative for rash.   Neurological:  Negative for dizziness, weakness and light-headedness.   Hematological:  Does not bruise/bleed easily.     Physical Exam     Initial Vitals [04/29/23 1132]   BP Pulse Resp Temp SpO2   (!) 169/99 110 18 99.6 °F (37.6 °C) 98 %      MAP       --         Physical Exam    Nursing note and vitals reviewed.  Constitutional: He appears well-developed and well-nourished.   HENT:   Head: Atraumatic.   Mouth/Throat: Mucous membranes are normal. There is trismus (mild) in the jaw. No uvula swelling. Posterior oropharyngeal edema present. No oropharyngeal exudate, posterior oropharyngeal erythema or  tonsillar abscesses (no swelling visible on soft palate, no visible tonsillar abscess).   Eyes: Conjunctivae and EOM are normal. Pupils are equal, round, and reactive to light.   Neck: Trachea normal. Neck supple. No stridor present.   Normal range of motion.  Cardiovascular:  Normal rate, regular rhythm and intact distal pulses.           Pulmonary/Chest: Breath sounds normal. No respiratory distress. He has no wheezes. He has no rhonchi. He has no rales.   Musculoskeletal:      Cervical back: Normal range of motion and neck supple. Normal range of motion.     Lymphadenopathy:     He has cervical adenopathy.        Left cervical: Superficial cervical adenopathy present.   Neurological: He is alert and oriented to person, place, and time. He has normal strength. GCS score is 15. GCS eye subscore is 4. GCS verbal subscore is 5. GCS motor subscore is 6.   Skin: Capillary refill takes less than 2 seconds. No rash noted.   Psychiatric: He has a normal mood and affect.       ED Course   Procedures  Labs Reviewed   CBC W/ AUTO DIFFERENTIAL - Abnormal; Notable for the following components:       Result Value    Hemoglobin 13.7 (*)     Gran # (ANC) 8.9 (*)     Gran % 74.6 (*)     Lymph % 17.3 (*)     All other components within normal limits   BASIC METABOLIC PANEL - Abnormal; Notable for the following components:    Sodium 135 (*)     CO2 14 (*)     Glucose 280 (*)     Anion Gap 18 (*)     All other components within normal limits   BASIC METABOLIC PANEL - Abnormal; Notable for the following components:    CO2 12 (*)     Glucose 280 (*)     Anion Gap 17 (*)     All other components within normal limits    Narrative:     Collection has been rescheduled by ZABRINA at 04/29/2023 17:49 Reason:   Draw with 8pm labs   BETA - HYDROXYBUTYRATE, SERUM - Abnormal; Notable for the following components:    Beta-Hydroxybutyrate 4.7 (*)     All other components within normal limits   BETA - HYDROXYBUTYRATE, SERUM - Abnormal; Notable for the  following components:    Beta-Hydroxybutyrate 4.7 (*)     All other components within normal limits   POCT GLUCOSE - Abnormal; Notable for the following components:    POCT Glucose 225 (*)     All other components within normal limits   ISTAT PROCEDURE - Abnormal; Notable for the following components:    POC PH 7.317 (*)     POC PCO2 28.0 (*)     POC HCO3 14.3 (*)     POC SATURATED O2 81 (*)     POC TCO2 15 (*)     All other components within normal limits   GROUP A STREP, MOLECULAR   CULTURE, AEROBIC  (SPECIFY SOURCE)   CULTURE, ANAEROBIC   CULTURE, ANAEROBIC   CULTURE, AEROBIC  (SPECIFY SOURCE)   HETEROPHILE AB SCREEN   PHOSPHORUS    Narrative:     With next lab draw  Collection has been rescheduled by [email or web address] at 04/29/2023 17:49 Reason:   Draw with 8pm labs   PHOSPHORUS    Narrative:     Collection has been rescheduled by [email or web address] at 04/29/2023 17:49 Reason:   Draw with 8pm labs   MAGNESIUM    Narrative:     With next lab draw  Collection has been rescheduled by [email or web address] at 04/29/2023 17:49 Reason:   Draw with 8pm labs   URINALYSIS, REFLEX TO URINE CULTURE   BASIC METABOLIC PANEL   LACTIC ACID, PLASMA   PHOSPHORUS   POCT GLUCOSE MONITORING CONTINUOUS   POCT GLUCOSE MONITORING CONTINUOUS   POCT GLUCOSE MONITORING CONTINUOUS   POCT GLUCOSE MONITORING CONTINUOUS          Imaging Results               CT Soft Tissue Neck With Contrast (Final result)  Result time 04/29/23 15:41:35      Final result by Brad Ponce MD (04/29/23 15:41:35)                   Impression:      This report was flagged in Epic as abnormal.    1. Diffuse enlargement of the left palatine tonsil noting internal rim enhancing fluid collections concerning for abscess.  Correlation and follow-up is advised.  There is leftward mass effect upon the airway without airway occlusion.  Continued follow-up is recommended.  2. Please see above for additional findings.      Electronically signed by: Brad Ponce MD  Date:    04/29/2023  Time:    15:41                Narrative:    EXAMINATION:  CT SOFT TISSUE NECK WITH CONTRAST    CLINICAL HISTORY:  Tonsil/adenoid disorder;Neck abscess, deep tissue;    TECHNIQUE:  Low dose axial images as well as sagittal and coronal reconstructions were performed from the skull base to the clavicles following the intravenous administration of 75 mL of Omnipaque 350.    COMPARISON:  None    FINDINGS:  The visualized portions of the bilateral pulmonary upper lobes are remarkable for bilateral peripheral upper lobe emphysematous change.  The thyroid gland, parotid glands, and remaining visualized very glands are grossly unremarkable.  No findings to suggest sialolithiasis.  There is left cervical chain lymphadenopathy.  There is enlargement of the left palatine tonsil with several regions of internal loculated appearing fluid suggesting tonsillar abscess.  The largest collection measures approximately 1.7 x 1.1 cm.  Left palatine tonsillar enlargement results in mass effect upon the airway without occlusion.  There is edema about the tonsil.  The right palatine tonsil is not enlarged.  The remainder of the tonsils are grossly unremarkable in appearance.  No focal organized fluid collections elsewhere.  The bilateral globes and orbital content are unremarkable.  The paranasal sinuses and mastoid air cells are clear.  No acute displaced fracture or dislocation of the cervical spine.  The major arterial and venous vasculature of the neck is patent.  The visualized intracranial content and intracranial circulation are grossly unremarkable.                                       Medications   benzocaine 20 % oral spray ( Mouth/Throat Given 4/29/23 1742)   LIDOcaine (PF) 10 mg/ml (1%) injection 100 mg ( Intradermal Canceled Entry 4/29/23 1815)   dextrose 10% bolus 125 mL 125 mL (has no administration in time range)   dextrose 10% bolus 125 mL 125 mL (has no administration in time range)   sodium chloride 0.9% flush 10 mL (has no administration in  time range)   dextrose 5 % and 0.45 % NaCl infusion (has no administration in time range)   dextrose 50% injection 25 g (has no administration in time range)   dextrose 50% injection 12.5 g (has no administration in time range)   potassium chloride 30 mEq in lactated ringers 1,015 mL infusion (has no administration in time range)   chlorhexidine 0.12 % solution 15 mL (has no administration in time range)   potassium chloride 30 mEq in dextrose 5% lactated ringers 1,015 mL infusion (has no administration in time range)   insulin regular 1 Units/mL in sodium chloride 0.9% 100 mL infusion (has no administration in time range)   insulin detemir U-100 (Levemir) pen 10 Units (has no administration in time range)   ketorolac injection 9.999 mg (9.999 mg Intravenous Given 4/29/23 1418)   acetaminophen tablet 650 mg (650 mg Oral Given 4/29/23 1418)   iohexoL (OMNIPAQUE 350) injection 75 mL (75 mLs Intravenous Given 4/29/23 1523)   clindamycin in D5W 900 mg/50 mL IVPB 900 mg (0 mg Intravenous Stopped 4/29/23 1755)   dexAMETHasone injection 12 mg (12 mg Intravenous Given 4/29/23 1655)   sodium chloride 0.9% bolus 1,000 mL 1,000 mL (0 mLs Intravenous Stopped 4/29/23 1841)   sodium chloride 0.9% bolus 1,000 mL 1,000 mL (0 mLs Intravenous Stopped 4/29/23 1915)           APC / Resident Notes:   Patient presents to the ER for evaluation due to sore throat beginning 2 days ago.  Patient's rapid strep is negative in the ED.  I reviewed the patient's chart.  He was seen in the ER on 4th last 06/2023 and found to have positive rapid strep test.  He was treated with amoxicillin and Decadron at that time.  Patient return to the ER on 04/26 and was found to have new onset diabetes with DKA.  He was admitted on insulin drip and discharged home yesterday with prescription for Levemir.  Patient's exam today reveals tonsillar swelling, but airway patent.  No soft palate swelling or obvious peritonsillar abscess on my exam.  Due to trismus,  hoarse and mildly muffled voice and patient's spitting secretions due to throat discomfort, I will obtain blood work and CT soft tissue of the neck.  Patient's CT shows findings concerning for peritonsillar abscess.  I have discussed with Dr. Diop, ENT on-call.  She is evaluated the patient in the ED and performed I&D.  Patient is given clindamycin and Decadron.  He is feeling improved with treatment provided in the ED.  Patient's labs again concerning for DKA with bicarb of 14 and anion gap of 18.  Urinalysis and beta hydroxybutyrate as well as VBG are collected.  UA with 3+ ketones and glucose, beta hydroxybutyrate elevated to 4.7, VBG with pH 7.31.   Will start insulin infusion and admit to Internal Medicine.  I have discussed with [facility] medicine team, patient has been seen in the ER by Dr. Camacho and admission orders are placed.  Discussed plan for admission with the patient.  He is comfortable with this plan.  I discussed the care this patient with my supervising physician.      ED Course as of 04/29/23 2147   Sat Apr 29, 2023   1636 Diffuse enlargement of the left palatine tonsil noting internal rim enhancing fluid collections concerning for abscess.  Correlation and follow-up is advised.  There is leftward mass effect upon the airway without airway occlusion.  Continued follow-up is recommended. - will call ENT for consultation [LH]   1642 Discussed with Dr. Diop on call for ENT, she will review imaging and evaluate patient to the ER [LH]      ED Course User Index  [LH] PASHA Jordan                 Clinical Impression:   Final diagnoses:  [E11.10] DKA, type 2        ED Disposition Condition    Admit                 PASHA Jordan  04/29/23 2200

## 2023-04-30 NOTE — ED NOTES
Report received from HARMAN García.   Patient ambulated to room. Awake, alert, oriented. Speech clear. Tolerating oral secretions. Breathing unlabored and even. In no acute distress.   Admit team at bedside. Xray at bedside.   RNs at bedside with lab attempting 2nd IV placement and lab draw.

## 2023-05-01 LAB
ALBUMIN SERPL BCP-MCNC: 2.9 G/DL (ref 3.5–5.2)
ALP SERPL-CCNC: 69 U/L (ref 55–135)
ALT SERPL W/O P-5'-P-CCNC: 10 U/L (ref 10–44)
ANION GAP SERPL CALC-SCNC: 13 MMOL/L (ref 8–16)
ANION GAP SERPL CALC-SCNC: 14 MMOL/L (ref 8–16)
AST SERPL-CCNC: 11 U/L (ref 10–40)
BASOPHILS # BLD AUTO: 0.01 K/UL (ref 0–0.2)
BASOPHILS NFR BLD: 0.1 % (ref 0–1.9)
BILIRUB SERPL-MCNC: 0.4 MG/DL (ref 0.1–1)
BUN SERPL-MCNC: 15 MG/DL (ref 6–20)
BUN SERPL-MCNC: 17 MG/DL (ref 6–20)
CALCIUM SERPL-MCNC: 10.1 MG/DL (ref 8.7–10.5)
CALCIUM SERPL-MCNC: 9.5 MG/DL (ref 8.7–10.5)
CHLORIDE SERPL-SCNC: 101 MMOL/L (ref 95–110)
CHLORIDE SERPL-SCNC: 102 MMOL/L (ref 95–110)
CO2 SERPL-SCNC: 17 MMOL/L (ref 23–29)
CO2 SERPL-SCNC: 18 MMOL/L (ref 23–29)
CREAT SERPL-MCNC: 1 MG/DL (ref 0.5–1.4)
CREAT SERPL-MCNC: 1.1 MG/DL (ref 0.5–1.4)
DIFFERENTIAL METHOD: ABNORMAL
EOSINOPHIL # BLD AUTO: 0 K/UL (ref 0–0.5)
EOSINOPHIL NFR BLD: 0 % (ref 0–8)
ERYTHROCYTE [DISTWIDTH] IN BLOOD BY AUTOMATED COUNT: 12.6 % (ref 11.5–14.5)
EST. GFR  (NO RACE VARIABLE): >60 ML/MIN/1.73 M^2
EST. GFR  (NO RACE VARIABLE): >60 ML/MIN/1.73 M^2
GLUCOSE SERPL-MCNC: 307 MG/DL (ref 70–110)
GLUCOSE SERPL-MCNC: 334 MG/DL (ref 70–110)
HCT VFR BLD AUTO: 34 % (ref 40–54)
HGB BLD-MCNC: 11.8 G/DL (ref 14–18)
HIV 1+2 AB+HIV1 P24 AG SERPL QL IA: NORMAL
IMM GRANULOCYTES # BLD AUTO: 0.09 K/UL (ref 0–0.04)
IMM GRANULOCYTES NFR BLD AUTO: 0.6 % (ref 0–0.5)
LYMPHOCYTES # BLD AUTO: 1.9 K/UL (ref 1–4.8)
LYMPHOCYTES NFR BLD: 12 % (ref 18–48)
MCH RBC QN AUTO: 28.4 PG (ref 27–31)
MCHC RBC AUTO-ENTMCNC: 34.7 G/DL (ref 32–36)
MCV RBC AUTO: 82 FL (ref 82–98)
MONOCYTES # BLD AUTO: 1 K/UL (ref 0.3–1)
MONOCYTES NFR BLD: 6.2 % (ref 4–15)
NEUTROPHILS # BLD AUTO: 12.6 K/UL (ref 1.8–7.7)
NEUTROPHILS NFR BLD: 81.1 % (ref 38–73)
NRBC BLD-RTO: 0 /100 WBC
PHOSPHATE SERPL-MCNC: 3.7 MG/DL (ref 2.7–4.5)
PLATELET # BLD AUTO: 257 K/UL (ref 150–450)
PMV BLD AUTO: 10.8 FL (ref 9.2–12.9)
POCT GLUCOSE: 266 MG/DL (ref 70–110)
POCT GLUCOSE: 279 MG/DL (ref 70–110)
POCT GLUCOSE: 321 MG/DL (ref 70–110)
POCT GLUCOSE: 323 MG/DL (ref 70–110)
POTASSIUM SERPL-SCNC: 4 MMOL/L (ref 3.5–5.1)
POTASSIUM SERPL-SCNC: 4.2 MMOL/L (ref 3.5–5.1)
PROT SERPL-MCNC: 7.6 G/DL (ref 6–8.4)
RBC # BLD AUTO: 4.16 M/UL (ref 4.6–6.2)
SODIUM SERPL-SCNC: 132 MMOL/L (ref 136–145)
SODIUM SERPL-SCNC: 133 MMOL/L (ref 136–145)
WBC # BLD AUTO: 15.51 K/UL (ref 3.9–12.7)

## 2023-05-01 PROCEDURE — 36415 COLL VENOUS BLD VENIPUNCTURE: CPT | Performed by: STUDENT IN AN ORGANIZED HEALTH CARE EDUCATION/TRAINING PROGRAM

## 2023-05-01 PROCEDURE — 80048 BASIC METABOLIC PNL TOTAL CA: CPT | Mod: XB | Performed by: STUDENT IN AN ORGANIZED HEALTH CARE EDUCATION/TRAINING PROGRAM

## 2023-05-01 PROCEDURE — 25000003 PHARM REV CODE 250: Performed by: OTOLARYNGOLOGY

## 2023-05-01 PROCEDURE — 36415 COLL VENOUS BLD VENIPUNCTURE: CPT

## 2023-05-01 PROCEDURE — 99222 1ST HOSP IP/OBS MODERATE 55: CPT | Mod: ,,, | Performed by: INTERNAL MEDICINE

## 2023-05-01 PROCEDURE — 25000003 PHARM REV CODE 250: Performed by: STUDENT IN AN ORGANIZED HEALTH CARE EDUCATION/TRAINING PROGRAM

## 2023-05-01 PROCEDURE — 85025 COMPLETE CBC W/AUTO DIFF WBC: CPT | Performed by: STUDENT IN AN ORGANIZED HEALTH CARE EDUCATION/TRAINING PROGRAM

## 2023-05-01 PROCEDURE — 99024 POSTOP FOLLOW-UP VISIT: CPT | Mod: ,,, | Performed by: OTOLARYNGOLOGY

## 2023-05-01 PROCEDURE — 84100 ASSAY OF PHOSPHORUS: CPT | Performed by: STUDENT IN AN ORGANIZED HEALTH CARE EDUCATION/TRAINING PROGRAM

## 2023-05-01 PROCEDURE — 80053 COMPREHEN METABOLIC PANEL: CPT

## 2023-05-01 PROCEDURE — 11000001 HC ACUTE MED/SURG PRIVATE ROOM

## 2023-05-01 PROCEDURE — 94761 N-INVAS EAR/PLS OXIMETRY MLT: CPT

## 2023-05-01 PROCEDURE — 99222 PR INITIAL HOSPITAL CARE,LEVL II: ICD-10-PCS | Mod: ,,, | Performed by: INTERNAL MEDICINE

## 2023-05-01 PROCEDURE — 87389 HIV-1 AG W/HIV-1&-2 AB AG IA: CPT | Performed by: STUDENT IN AN ORGANIZED HEALTH CARE EDUCATION/TRAINING PROGRAM

## 2023-05-01 PROCEDURE — 99024 PR POST-OP FOLLOW-UP VISIT: ICD-10-PCS | Mod: ,,, | Performed by: OTOLARYNGOLOGY

## 2023-05-01 RX ORDER — CEPHALEXIN 500 MG/1
500 CAPSULE ORAL 4 TIMES DAILY
Status: DISCONTINUED | OUTPATIENT
Start: 2023-05-01 | End: 2023-05-02 | Stop reason: HOSPADM

## 2023-05-01 RX ADMIN — MUPIROCIN: 20 OINTMENT TOPICAL at 09:05

## 2023-05-01 RX ADMIN — INSULIN ASPART 3 UNITS: 100 INJECTION, SOLUTION INTRAVENOUS; SUBCUTANEOUS at 09:05

## 2023-05-01 RX ADMIN — CHLORHEXIDINE GLUCONATE 0.12% ORAL RINSE 15 ML: 1.2 LIQUID ORAL at 09:05

## 2023-05-01 RX ADMIN — CLINDAMYCIN IN 5 PERCENT DEXTROSE 600 MG: 12 INJECTION, SOLUTION INTRAVENOUS at 04:05

## 2023-05-01 RX ADMIN — CHLORHEXIDINE GLUCONATE 0.12% ORAL RINSE 15 ML: 1.2 LIQUID ORAL at 02:05

## 2023-05-01 RX ADMIN — CEPHALEXIN 500 MG: 500 CAPSULE ORAL at 09:05

## 2023-05-01 RX ADMIN — INSULIN ASPART 8 UNITS: 100 INJECTION, SOLUTION INTRAVENOUS; SUBCUTANEOUS at 04:05

## 2023-05-01 RX ADMIN — CLINDAMYCIN IN 5 PERCENT DEXTROSE 600 MG: 12 INJECTION, SOLUTION INTRAVENOUS at 01:05

## 2023-05-01 RX ADMIN — CLINDAMYCIN IN 5 PERCENT DEXTROSE 600 MG: 12 INJECTION, SOLUTION INTRAVENOUS at 09:05

## 2023-05-01 RX ADMIN — INSULIN ASPART 8 UNITS: 100 INJECTION, SOLUTION INTRAVENOUS; SUBCUTANEOUS at 11:05

## 2023-05-01 NOTE — PLAN OF CARE
VIRTUAL NURSE:  Labs, notes, orders, and careplan reviewed. VN to be available as needed.    Problem: Diabetic Ketoacidosis  Goal: Fluid and Electrolyte Balance with Absence of Ketosis  Outcome: Ongoing, Progressing     Problem: Adult Inpatient Plan of Care  Goal: Plan of Care Review  Outcome: Ongoing, Progressing  Goal: Patient-Specific Goal (Individualized)  Outcome: Ongoing, Progressing  Goal: Absence of Hospital-Acquired Illness or Injury  Outcome: Ongoing, Progressing  Goal: Optimal Comfort and Wellbeing  Outcome: Ongoing, Progressing  Goal: Readiness for Transition of Care  Outcome: Ongoing, Progressing     Problem: Diabetes Comorbidity  Goal: Blood Glucose Level Within Targeted Range  Outcome: Ongoing, Progressing     Problem: Infection  Goal: Absence of Infection Signs and Symptoms  Outcome: Ongoing, Progressing

## 2023-05-01 NOTE — NURSING
At this time notified physician on-call of pt blood glucose of 395 mg/dL; physician on-call states looking into patient chart and will get back with directive shortly; nursing continuing to monitor pt this shift at this time; NADN; pt asymptomatic; awaiting MD directive at this time; charge nurse notified.

## 2023-05-01 NOTE — CONSULTS
Food & Nutrition  Education    Diet Education: Carbohydrate Controlled Diet Education  Time Spent: 15 minutes  Learners: Pt      Nutrition Education provided with handouts:   Carbohydrate Counting for People with Diabetes    Comments:  Educated pt on diabetic diet and carbohydrate counting including what counts as a carbohydrate serving and the number of servings he should have in a day based on his dietary needs. Pt expressed understanding.    All questions and concerns answered. Dietitian's contact information provided.       Follow-Up: 5/8/2023    Please Re-consult as needed        Thanks!

## 2023-05-01 NOTE — CONSULTS
[facility] Consult Note    Primary Attending Physician: Dr. Tiana STONE  Consultant Attending: Dr. Aleena STONE    Reason for Consult:     Antibiotic regimen    Assessment/Recommendations     Jonh Figueroa is a 33 y.o. male with DM2 on insulin, found to have a peritonsillar abscess, s/p drainage by ENT. First tested positive in rapid strep test at ED on 4/26, was given amoxicillin for 2 weeks with initial mild imrpovement. Culture from drainage grew + Strep Agalactiae. Routinely sensitive to penicillins, cephalosporins and carbapenems. Has been on Clinda IV since 4/30.  uptrending leukocytosis of 15.5 likely due to the recent IV steroid aministration as improving symptomatically.     - Discontinue clindamycin  - Begin Keflex 500mg QID for 2 weeks from drainage.     Thank you for allowing us to participate in the care of this patient. Pt seen with staff Dr. Flood, ID will sign off. Please contact us if you have any questions regarding this consult.    Camille Rebolledo   [facility] PGY3/HO2  [facility]    Subjective:      History of Present Illness:  Jonh Figueroa is a 33 y.o. new onset DM uncontrolled w/o complications on insulin, normocytic anemia, suspected flow murmur, presents for sore throat 2 days. The patient presented to [facility] on 4/29/2023 with a primary complaint of Sore Throat. Sore throat X2 days. Unable to swallow. Difficulty talking due to pain. Has had strep throat before and this feels similar.     ON exam today pt is s/p drainage with ENT and 3 days of clindamycin. Reports feeling much better, now able to speak clearly unlike at presentation,. No fevers or chills.     Past Medical History:  DM2    Surgeries:  None    Allergies:  Review of patient's allergies indicates:  No Known Allergies    Medications:   In-Hospital Scheduled Medications:   chlorhexidine  15 mL Mouth/Throat TID    clindamycin (CLEOCIN) IVPB  600 mg Intravenous Q8H    insulin detemir U-100   "15 Units Subcutaneous QHS    LIDOcaine (PF) 10 mg/ml (1%)  10 mL Intradermal Once    mupirocin   Nasal BID      In-Hospital PRN Medications:  benzocaine, dextrose 10%, dextrose 10%, dextrose 10%, dextrose 10%, dextrose 10%, dextrose, dextrose, dextrose 5 % and 0.45 % NaCl, glucagon (human recombinant), insulin aspart U-100, sodium chloride 0.9%   In-Hospital IV Infusion Medications:   dextrose 5 % and 0.45 % NaCl        Home Medications:  Prior to Admission medications    Medication Sig Start Date End Date Taking? Authorizing Provider   insulin detemir U-100, Levemir, 100 unit/mL (3 mL) SubQ InPn pen Inject 15 Units into the skin every evening. 23  Yes    blood sugar diagnostic Strp use as directed to check blood sugar every morning after meal and every evening 23      blood-glucose meter Misc use as directed 23      lancets 30 gauge Misc use as directed to check blood sugar every morning after meal and every evening 23      pen needle, diabetic 32 gauge x 5/32" Ndle use as directed to inject insulin 23          Family History:  No family history on file.    Social History:       ROS as in HPI       Objective:   Last 24 Hour Vital Signs:  BP  Min: 115/70  Max: 141/91  Temp  Av °F (36.1 °C)  Min: 96.2 °F (35.7 °C)  Max: 98.2 °F (36.8 °C)  Pulse  Av.1  Min: 70  Max: 105  Resp  Av.4  Min: 15  Max: 18  SpO2  Av.9 %  Min: 96 %  Max: 100 %  I/O last 3 completed shifts:  In: 4815.3 [I.V.:2734.9; IV Piggyback:0.4]  Out: 2600 [Urine:2600]    Physical Exam  Vitals reviewed.   Constitutional:       General: He is not in acute distress.     Appearance: Normal appearance. He is not toxic-appearing.   Cardiovascular:      Rate and Rhythm: Normal rate.   Pulmonary:      Effort: Pulmonary effort is normal. No respiratory distress.   Musculoskeletal:      Cervical back: Normal range of motion.   Skin:     Findings: No erythema or lesion.   Neurological:      Mental Status: He is alert " and oriented to person, place, and time.   Psychiatric:         Behavior: Behavior normal.       Laboratory Results:  Trended Lab Data:  Recent Labs   Lab 04/27/23  0439 04/27/23  0757 04/28/23  0321 04/29/23  1422 04/29/23  1519 04/30/23  0638 04/30/23  0850 04/30/23  1432 04/30/23  2351 05/01/23  0429   WBC 10.02  --  9.01 11.96  --  9.55  --   --   --  15.51*   HGB 11.3*  --  12.2* 13.7*  --  11.8*  --   --   --  11.8*   HCT 34.1*  --  37.2* 41.1  --  34.7*  --   --   --  34.0*     --  212 211  --  221  --   --   --  257   MCV 84  --  84 84  --  84  --   --   --  82   RDW 12.0  --  12.1 12.7  --  12.4  --   --   --  12.6   *  135*  135*   < > 137  137  --    < > 133*   < > 135* 133* 132*   K 3.7  3.7  3.7   < > 3.7  3.7  --    < > 4.0   < > 4.2 4.2 4.0     107  107   < > 105  105  --    < > 107   < > 105 101 102   CO2 17*  17*  17*   < > 19*  19*  --    < > 18*   < > 18* 18* 17*   BUN 9  9  9   < > 11  11  --    < > 8   < > 9 17 15   *  323*  323*   < > 250*  250*  --    < > 234*   < > 249* 334* 307*   PROT 6.8  --  7.2  --   --   --   --   --   --  7.6   ALBUMIN 2.9*  --  3.1*  --   --   --   --   --   --  2.9*   BILITOT 0.5  --  0.6  --   --   --   --   --   --  0.4   AST 10  --  12  --   --   --   --   --   --  11   ALKPHOS 70  --  75  --   --   --   --   --   --  69   ALT 11  --  11  --   --   --   --   --   --  10    < > = values in this interval not displayed.     Urinalysis:   Lab Results   Component Value Date    COLORU Yellow 04/29/2023    SPECGRAV >1.030 (A) 04/29/2023    NITRITE Negative 04/29/2023    KETONESU 3+ (A) 04/29/2023    UROBILINOGEN Negative 04/29/2023       Microbiology Data:  4/29: aerobic cultures; Strep Galactiae    Antimicrobials:  4/06-4/20: amoxicillin  4/29- now: clinda    Other Results:    Radiology:  X-Ray Chest PA And Lateral    Result Date: 4/26/2023  EXAMINATION: XR CHEST PA AND LATERAL TECHNIQUE: PA and lateral views of the chest were  performed. COMPARISON: None FINDINGS: The cardiac silhouette is normal in size, midline. The pulmonary vascularity is normal. The pulmonary waqas appear normal bilaterally. The lungs are well expanded and clear. No focal airspace disease. No pleural effusion, no pneumothorax. The osseous structures appear within normal limits for age.     No significant abnormality seen. Electronically signed by: Lauren Downing MD Date:    04/26/2023 Time:    16:33    CT Soft Tissue Neck With Contrast    Result Date: 4/29/2023  EXAMINATION: CT SOFT TISSUE NECK WITH CONTRAST CLINICAL HISTORY: Tonsil/adenoid disorder;Neck abscess, deep tissue; TECHNIQUE: Low dose axial images as well as sagittal and coronal reconstructions were performed from the skull base to the clavicles following the intravenous administration of 75 mL of Omnipaque 350. COMPARISON: None FINDINGS: The visualized portions of the bilateral pulmonary upper lobes are remarkable for bilateral peripheral upper lobe emphysematous change.  The thyroid gland, parotid glands, and remaining visualized very glands are grossly unremarkable.  No findings to suggest sialolithiasis.  There is left cervical chain lymphadenopathy.  There is enlargement of the left palatine tonsil with several regions of internal loculated appearing fluid suggesting tonsillar abscess.  The largest collection measures approximately 1.7 x 1.1 cm.  Left palatine tonsillar enlargement results in mass effect upon the airway without occlusion.  There is edema about the tonsil.  The right palatine tonsil is not enlarged.  The remainder of the tonsils are grossly unremarkable in appearance.  No focal organized fluid collections elsewhere.  The bilateral globes and orbital content are unremarkable.  The paranasal sinuses and mastoid air cells are clear.  No acute displaced fracture or dislocation of the cervical spine.  The major arterial and venous vasculature of the neck is patent.  The visualized  intracranial content and intracranial circulation are grossly unremarkable.     This report was flagged in Epic as abnormal. 1. Diffuse enlargement of the left palatine tonsil noting internal rim enhancing fluid collections concerning for abscess.  Correlation and follow-up is advised.  There is leftward mass effect upon the airway without airway occlusion.  Continued follow-up is recommended. 2. Please see above for additional findings. Electronically signed by: Brad Ponce MD Date:    04/29/2023 Time:    15:41    X-Ray Chest AP Portable    Result Date: 4/29/2023  EXAMINATION: XR CHEST AP PORTABLE CLINICAL HISTORY: DKA; TECHNIQUE: Single frontal view of the chest was performed. COMPARISON: 04/26/2023. FINDINGS: The lungs are well expanded and clear. No focal opacities are seen. The pleural spaces are clear. The cardiac silhouette is unremarkable. The visualized osseous structures are unremarkable.     No acute abnormality. Electronically signed by: Giacomo Shin Date:    04/29/2023 Time:    20:51    Echo    Result Date: 4/28/2023  · The left ventricle is normal in size with concentric remodeling and normal systolic function. · The estimated ejection fraction is 55%. · Normal left ventricular diastolic function. · Normal right ventricular size with normal right ventricular systolic function. · The estimated PA systolic pressure is 24 mmHg. · Normal central venous pressure (3 mmHg).

## 2023-05-01 NOTE — PLAN OF CARE
CHANTELL met with pt at bedside this AM to complete DCA. Pt stated that he lives at home with LIANA Jacobson [phone] and will have her help transport pt home at time of d/c. Per pt he does not use any HME and is fully independent in his ADL's. SW requested f/u appts. White board updated with CM name and contact information.  Discharge brochure provided.  Pt encouraged to call with any questions or concerns.  Cm will continue to follow pt through transitions of care and assist with any discharge needs.    Spenser Merrill, STEPHEN  [phone]    Future Appointments   Date Time Provider Department Center   5/16/2023  1:30 PM Becky Calle MD [facility]        05/01/23 1203   Discharge Assessment   Assessment Type Discharge Planning Assessment   Confirmed/corrected address, phone number and insurance Yes   Confirmed Demographics Correct on Facesheet   Source of Information patient   When was your last doctors appointment?   (pt is not sure)   Communicated WALTER with patient/caregiver Yes   Reason For Admission DKA   People in Home significant other   Facility Arrived From: HOME   Do you expect to return to your current living situation? Yes   Do you have help at home or someone to help you manage your care at home? Yes   Who are your caregiver(s) and their phone number(s)? LIANA Quinonez [phone]   Prior to hospitilization cognitive status: Alert/Oriented   Current cognitive status: Alert/Oriented   Do you have any problems with: Errands/Grocery   Home Accessibility wheelchair accessible   Home Layout Able to live on 1st floor   Equipment Currently Used at Home none   Readmission within 30 days? No   Patient currently being followed by outpatient case management? No   Do you currently have service(s) that help you manage your care at home? No   Do you take prescription medications? Yes   Do you have prescription coverage? Yes   Coverage medicaid   Do you have any problems affording any of your prescribed  medications? No   Is the patient taking medications as prescribed? yes   Who is going to help you get home at discharge? LIANA Jacobson [phone]   Are you on dialysis? No   Do you take coumadin? No   Discharge Plan A Home with family   DME Needed Upon Discharge  none   Discharge Plan discussed with: Patient   Discharge Barriers Identified None   OTHER   Name(s) of People in Home  --

## 2023-05-01 NOTE — MEDICAL/APP STUDENT
[facility] Consult Note    Primary Attending Physician: Lewis Montiel MD  Consultant Attending: Jasvir Flood MD    Reason for Consult:     Peritonsillar abscess      Assessment:     Jonh Figueroa is a 33 y.o. male with Pmhx of DM, normocytic anemia, suspected flow murmur, recent strep throat (4/6/2023) presented for sore throat with associated difficulty swallowing. CT neck with contrast positive for loculated fluid suggestive of of peritonsillar abscess. S/P I and D 04/29. S/p dexamethasone taper 04/30Abscess culture grew S. Agalactiae susceptible to penicillin, cephalosporins, and carbapenem.        Plan:     - recommend keflex 500mg QID  - stop clindamycin.      Thank you for allowing us to participate in the care of this patient. Please contact me if you have any questions regarding this consult.    Torrey Simon  [employer] 3rd year medical student    Subjective:      History of Present Illness:  Jonh Figueroa is a 33 y.o. male with Pmhx   DM, normocytic anemia, suspected flow murmur, recent strep throat (4/6/2023) presented to the ED with difficulty sore throat and difficulty swallowing for 2 days. Patient was evaluated for sore throat on 04/06/2023 at this ED, diagnosed with strep pharyngitis, given a shot of decadron discharged with amoxicillin 500mg TID for 10 days. Patient reported adherence to the prescribed abx treatment. He was seen at [facility] for fatigue on 04/26/2023, admitted for DKA and subsequently discharged. Eventually he presented on 04/29/2023, for difficulty swallowing and sore throat.  He states that he is doing better today and can swallow and hold a conversation. Denies fever, chills, SOB, n/v, abd pain, urinary symptoms, and headache.     Allergies:  Review of patient's allergies indicates:  No Known Allergies    Medications:   In-Hospital Scheduled Medications:   chlorhexidine  15 mL Mouth/Throat TID    clindamycin (CLEOCIN) IVPB  600 mg Intravenous Q8H    insulin detemir  "U-100  15 Units Subcutaneous QHS    LIDOcaine (PF) 10 mg/ml (1%)  10 mL Intradermal Once    mupirocin   Nasal BID      In-Hospital PRN Medications:  benzocaine, dextrose 10%, dextrose 10%, dextrose 10%, dextrose 10%, dextrose 10%, dextrose, dextrose, dextrose 5 % and 0.45 % NaCl, glucagon (human recombinant), insulin aspart U-100, sodium chloride 0.9%   In-Hospital IV Infusion Medications:   dextrose 5 % and 0.45 % NaCl        Home Medications:  Prior to Admission medications    Medication Sig Start Date End Date Taking? Authorizing Provider   insulin detemir U-100, Levemir, 100 unit/mL (3 mL) SubQ InPn pen Inject 15 Units into the skin every evening. 23  Yes    blood sugar diagnostic Strp use as directed to check blood sugar every morning after meal and every evening 23      blood-glucose meter Misc use as directed 23      lancets 30 gauge Misc use as directed to check blood sugar every morning after meal and every evening 23      pen needle, diabetic 32 gauge x 5/32" Ndle use as directed to inject insulin 23          Family History:  No family history on file.       ROS: Per HPI       Objective:   Last 24 Hour Vital Signs:  BP  Min: 119/73  Max: 141/91  Temp  Av.8 °F (36 °C)  Min: 96.2 °F (35.7 °C)  Max: 98.2 °F (36.8 °C)  Pulse  Av.1  Min: 67  Max: 105  Resp  Av  Min: 18  Max: 18  SpO2  Av.9 %  Min: 96 %  Max: 100 %  I/O last 3 completed shifts:  In: 4815.3 [I.V.:2734.9; IV Piggyback:0.4]  Out: 2600 [Urine:2600]    Physical Exam  Constitutional:       General: He is not in acute distress.     Appearance: Normal appearance. He is not ill-appearing.   HENT:      Head: Normocephalic and atraumatic.      Nose: No congestion or rhinorrhea.   Eyes:      General:         Right eye: No discharge.         Left eye: No discharge.      Conjunctiva/sclera: Conjunctivae normal.   Cardiovascular:      Rate and Rhythm: Normal rate.   Pulmonary:      Effort: Pulmonary effort is " normal. No respiratory distress.   Musculoskeletal:         General: Normal range of motion.      Cervical back: Normal range of motion.   Skin:     General: Skin is warm and dry.   Neurological:      General: No focal deficit present.      Mental Status: He is alert and oriented to person, place, and time.   Psychiatric:         Mood and Affect: Mood normal.         Behavior: Behavior normal.       Laboratory Results:  Most Recent Data:  CBC:   Lab Results   Component Value Date    WBC 15.51 (H) 05/01/2023    HGB 11.8 (L) 05/01/2023    HCT 34.0 (L) 05/01/2023     05/01/2023    MCV 82 05/01/2023    RDW 12.6 05/01/2023     BMP:   Lab Results   Component Value Date     (L) 05/01/2023    K 4.0 05/01/2023     05/01/2023    CO2 17 (L) 05/01/2023    BUN 15 05/01/2023     (H) 05/01/2023    CALCIUM 9.5 05/01/2023    MG 1.8 04/29/2023    PHOS 3.7 05/01/2023     LFTs:   Lab Results   Component Value Date    PROT 7.6 05/01/2023    ALBUMIN 2.9 (L) 05/01/2023    BILITOT 0.4 05/01/2023    AST 11 05/01/2023    ALKPHOS 69 05/01/2023    ALT 10 05/01/2023     Coags: No results found for: INR, PROTIME, PTT  FLP:   Lab Results   Component Value Date    CHOL 150 04/26/2023    HDL 39 (L) 04/26/2023    LDLCALC 82.2 04/26/2023    TRIG 144 04/26/2023    CHOLHDL 26.0 04/26/2023     DM:   Lab Results   Component Value Date    HGBA1C 11.3 (H) 04/26/2023    LDLCALC 82.2 04/26/2023    CREATININE 1.0 05/01/2023     Thyroid:   Lab Results   Component Value Date    TSH 2.861 04/26/2023     Anemia:   Lab Results   Component Value Date    IRON 69 04/28/2023    TIBC 222 (L) 04/28/2023    FERRITIN 495 (H) 04/28/2023    ZATFLFRQ20 862 04/28/2023    FOLATE 11.0 04/28/2023     Cardiac:   Lab Results   Component Value Date    TROPONINI <0.006 04/26/2023    BNP <10 04/26/2023     Urinalysis:   Lab Results   Component Value Date    COLORU Yellow 04/29/2023    SPECGRAV >1.030 (A) 04/29/2023    NITRITE Negative 04/29/2023    KETONESU  3+ (A) 04/29/2023    UROBILINOGEN Negative 04/29/2023       Trended Lab Data:  Recent Labs   Lab 04/27/23  0439 04/27/23  0757 04/28/23  0321 04/29/23  1422 04/29/23  1519 04/30/23  0638 04/30/23  0850 04/30/23  1432 04/30/23  2351 05/01/23  0429   WBC 10.02  --  9.01 11.96  --  9.55  --   --   --  15.51*   HGB 11.3*  --  12.2* 13.7*  --  11.8*  --   --   --  11.8*   HCT 34.1*  --  37.2* 41.1  --  34.7*  --   --   --  34.0*     --  212 211  --  221  --   --   --  257   MCV 84  --  84 84  --  84  --   --   --  82   RDW 12.0  --  12.1 12.7  --  12.4  --   --   --  12.6   *  135*  135*   < > 137  137  --    < > 133*   < > 135* 133* 132*   K 3.7  3.7  3.7   < > 3.7  3.7  --    < > 4.0   < > 4.2 4.2 4.0     107  107   < > 105  105  --    < > 107   < > 105 101 102   CO2 17*  17*  17*   < > 19*  19*  --    < > 18*   < > 18* 18* 17*   BUN 9  9  9   < > 11  11  --    < > 8   < > 9 17 15   *  323*  323*   < > 250*  250*  --    < > 234*   < > 249* 334* 307*   PROT 6.8  --  7.2  --   --   --   --   --   --  7.6   ALBUMIN 2.9*  --  3.1*  --   --   --   --   --   --  2.9*   BILITOT 0.5  --  0.6  --   --   --   --   --   --  0.4   AST 10  --  12  --   --   --   --   --   --  11   ALKPHOS 70  --  75  --   --   --   --   --   --  69   ALT 11  --  11  --   --   --   --   --   --  10    < > = values in this interval not displayed.         Microbiology Data:  04/29/2023 Left tonsil culture: Strep Agalactiae susceptible to penicillin, cephalosporins, and carbapenems.    04/29 Group A strep - molecular negative  04/06 Group A strep - molecular positive    Antimicrobials:  Clindamycin 04/29 to present    Other Results:    Radiology:  X-Ray Chest PA And Lateral    Result Date: 4/26/2023  EXAMINATION: XR CHEST PA AND LATERAL TECHNIQUE: PA and lateral views of the chest were performed. COMPARISON: None FINDINGS: The cardiac silhouette is normal in size, midline. The pulmonary vascularity is normal.  The pulmonary waqas appear normal bilaterally. The lungs are well expanded and clear. No focal airspace disease. No pleural effusion, no pneumothorax. The osseous structures appear within normal limits for age.     No significant abnormality seen. Electronically signed by: Lauren Downing MD Date:    04/26/2023 Time:    16:33    CT Soft Tissue Neck With Contrast    Result Date: 4/29/2023  EXAMINATION: CT SOFT TISSUE NECK WITH CONTRAST CLINICAL HISTORY: Tonsil/adenoid disorder;Neck abscess, deep tissue; TECHNIQUE: Low dose axial images as well as sagittal and coronal reconstructions were performed from the skull base to the clavicles following the intravenous administration of 75 mL of Omnipaque 350. COMPARISON: None FINDINGS: The visualized portions of the bilateral pulmonary upper lobes are remarkable for bilateral peripheral upper lobe emphysematous change.  The thyroid gland, parotid glands, and remaining visualized very glands are grossly unremarkable.  No findings to suggest sialolithiasis.  There is left cervical chain lymphadenopathy.  There is enlargement of the left palatine tonsil with several regions of internal loculated appearing fluid suggesting tonsillar abscess.  The largest collection measures approximately 1.7 x 1.1 cm.  Left palatine tonsillar enlargement results in mass effect upon the airway without occlusion.  There is edema about the tonsil.  The right palatine tonsil is not enlarged.  The remainder of the tonsils are grossly unremarkable in appearance.  No focal organized fluid collections elsewhere.  The bilateral globes and orbital content are unremarkable.  The paranasal sinuses and mastoid air cells are clear.  No acute displaced fracture or dislocation of the cervical spine.  The major arterial and venous vasculature of the neck is patent.  The visualized intracranial content and intracranial circulation are grossly unremarkable.     This report was flagged in Epic as abnormal. 1.  Diffuse enlargement of the left palatine tonsil noting internal rim enhancing fluid collections concerning for abscess.  Correlation and follow-up is advised.  There is leftward mass effect upon the airway without airway occlusion.  Continued follow-up is recommended. 2. Please see above for additional findings. Electronically signed by: Brad Ponce MD Date:    04/29/2023 Time:    15:41    X-Ray Chest AP Portable    Result Date: 4/29/2023  EXAMINATION: XR CHEST AP PORTABLE CLINICAL HISTORY: DKA; TECHNIQUE: Single frontal view of the chest was performed. COMPARISON: 04/26/2023. FINDINGS: The lungs are well expanded and clear. No focal opacities are seen. The pleural spaces are clear. The cardiac silhouette is unremarkable. The visualized osseous structures are unremarkable.     No acute abnormality. Electronically signed by: Giacomo Shin Date:    04/29/2023 Time:    20:51    Echo    Result Date: 4/28/2023  · The left ventricle is normal in size with concentric remodeling and normal systolic function. · The estimated ejection fraction is 55%. · Normal left ventricular diastolic function. · Normal right ventricular size with normal right ventricular systolic function. · The estimated PA systolic pressure is 24 mmHg. · Normal central venous pressure (3 mmHg).

## 2023-05-01 NOTE — PROGRESS NOTES
[facility] Medicine Progress Note    Primary Team: \[facility] [place] [facility]\"" Hospitalist Team B  Attending Physician: Lewis Montiel MD  Resident: Brian  Intern: [facility] Day: 2 days    Chief Complaint: Sore throat for 2 days    Subjective:     NAEON. Required 15u aspart yesterday. Tolerating diet. No complaints at this time. Denies CP/SOB/N/V/abd pain.        Objective:   Last 24 Hour Vital Signs:  BP  Min: 115/70  Max: 141/91  Temp  Av.1 °F (36.2 °C)  Min: 96.2 °F (35.7 °C)  Max: 98.2 °F (36.8 °C)  Pulse  Av  Min: 74  Max: 105  Resp  Av.3  Min: 15  Max: 18  SpO2  Av.7 %  Min: 96 %  Max: 100 %    Intake/Output Summary (Last 24 hours) at 2023 1001  Last data filed at 2023 1100  Gross per 24 hour   Intake 46.74 ml   Output --   Net 46.74 ml        Physical Examination:  Physical Exam  Constitutional:       Appearance: He is normal weight.   HENT:      Mouth/Throat:      Mouth: Mucous membranes are moist.      Pharynx: Oropharynx is clear.   Eyes:      Pupils: Pupils are equal, round, and reactive to light.   Neck:      Comments: No tonsillar swelling noted. Well healing.  Cardiovascular:      Rate and Rhythm: Normal rate and regular rhythm.      Heart sounds: Normal heart sounds.   Pulmonary:      Effort: Pulmonary effort is normal. No respiratory distress.      Breath sounds: Normal breath sounds. No wheezing.   Abdominal:      General: Bowel sounds are normal. There is no distension.      Palpations: Abdomen is soft.      Tenderness: There is no abdominal tenderness.   Musculoskeletal:      Right lower leg: No edema.      Left lower leg: No edema.   Lymphadenopathy:      Cervical: No cervical adenopathy.   Skin:     Capillary Refill: Capillary refill takes less than 2 seconds.   Neurological:      General: No focal deficit present.      Mental Status: He is alert and oriented to person, place, and time.         Laboratory:  Recent Labs   Lab 23  0439 23  0757 23  0321  04/29/23  1422 04/29/23  1519 04/30/23  0638 04/30/23  0850 04/30/23  1432 04/30/23  2351 05/01/23  0429   WBC 10.02  --  9.01 11.96  --  9.55  --   --   --  15.51*   HGB 11.3*  --  12.2* 13.7*  --  11.8*  --   --   --  11.8*   HCT 34.1*  --  37.2* 41.1  --  34.7*  --   --   --  34.0*     --  212 211  --  221  --   --   --  257   MCV 84  --  84 84  --  84  --   --   --  82   RDW 12.0  --  12.1 12.7  --  12.4  --   --   --  12.6   *  135*  135*   < > 137  137  --    < > 133*   < > 135* 133* 132*   K 3.7  3.7  3.7   < > 3.7  3.7  --    < > 4.0   < > 4.2 4.2 4.0     107  107   < > 105  105  --    < > 107   < > 105 101 102   CO2 17*  17*  17*   < > 19*  19*  --    < > 18*   < > 18* 18* 17*   BUN 9  9  9   < > 11  11  --    < > 8   < > 9 17 15   CREATININE 1.0  1.0  1.0   < > 1.0  1.0  --    < > 0.9   < > 1.0 1.1 1.0   *  323*  323*   < > 250*  250*  --    < > 234*   < > 249* 334* 307*   PROT 6.8  --  7.2  --   --   --   --   --   --  7.6   ALBUMIN 2.9*  --  3.1*  --   --   --   --   --   --  2.9*   BILITOT 0.5  --  0.6  --   --   --   --   --   --  0.4   AST 10  --  12  --   --   --   --   --   --  11   ALKPHOS 70  --  75  --   --   --   --   --   --  69   ALT 11  --  11  --   --   --   --   --   --  10    < > = values in this interval not displayed.       Microbiology Results (last 7 days)       Procedure Component Value Units Date/Time    Aerobic culture (Specify Source) **CANNOT BE ORDERED AS STAT** [[id number]]  (Abnormal) Collected: 04/29/23 1842    Order Status: Completed Specimen: Abscess from Tonsil, Left Updated: 05/01/23 0752     Aerobic Bacterial Culture STREPTOCOCCUS AGALACTIAE (GROUP B)  Moderate  Beta-hemolytic streptococci are routinely susceptible to   penicillins,cephalosporins and carbapenems.  Susceptibility testing not routinely performed  No other significant isolate      Aerobic culture [[id number]]  (Abnormal) Collected: 04/29/23 2305    Order Status:  Completed Specimen: Abscess from Tonsil, Left Updated: 05/01/23 0752     Aerobic Bacterial Culture STREPTOCOCCUS AGALACTIAE (GROUP B)  Moderate  Beta-hemolytic streptococci are routinely susceptible to   penicillins,cephalosporins and carbapenems.  Susceptibility testing not routinely performed  No other significant isolate      Culture, Anaerobe [[id number]] Collected: 04/29/23 1842    Order Status: Completed Specimen: Abscess from Tonsil, Left Updated: 05/01/23 0720     Anaerobic Culture Culture in progress    Culture, Anaerobe [[id number]] Collected: 04/29/23 2305    Order Status: Completed Specimen: Abscess from Tonsil, Left Updated: 05/01/23 0720     Anaerobic Culture Culture in progress    Group A Strep, Molecular [[id number]] Collected: 04/29/23 1251    Order Status: Completed Specimen: Throat Updated: 04/29/23 1311     Group A Strep, Molecular Negative     Comment: Arcanobacterium haemolyticum and Beta Streptococcus group C   and G will not be detected by this test method.  Please order   Throat Culture ([id number]) if suspected.                  I have personally reviewed the above laboratory studies.     Imaging:  EKG (my interpretation): normal sinus rhythm    CT Soft Tissue Neck With Contrast    Result Date: 4/29/2023  Diffuse enlargement of the left palatine tonsil noting internal rim enhancing fluid collections concerning for abscess.  Correlation and follow-up is advised.  There is leftward mass effect upon the airway without airway occlusion.  Continued follow-up is recommended. 2. Please see above for additional findings.     X-Ray Chest AP Portable  Result Date: 4/29/2023    Echo    Result Date: 4/28/2023  · The left ventricle is normal in size with concentric remodeling and normal systolic function. · The estimated ejection fraction is 55%. · Normal left ventricular diastolic function. · Normal right ventricular size with normal right ventricular systolic function. · The estimated PA systolic pressure is  24 mmHg. · Normal central venous pressure (3 mmHg).         Current Medications:     Scheduled:   chlorhexidine  15 mL Mouth/Throat TID    clindamycin (CLEOCIN) IVPB  600 mg Intravenous Q8H    insulin detemir U-100  15 Units Subcutaneous QHS    LIDOcaine (PF) 10 mg/ml (1%)  10 mL Intradermal Once    mupirocin   Nasal BID         Infusions:   dextrose 5 % and 0.45 % NaCl          PRN:  benzocaine, dextrose 10%, dextrose 10%, dextrose 10%, dextrose 10%, dextrose 10%, dextrose, dextrose, dextrose 5 % and 0.45 % NaCl, glucagon (human recombinant), insulin aspart U-100, sodium chloride 0.9%  Antibiotics and Day Number of Therapy:  Antibiotics (From admission, onward)      Start     Stop Route Frequency Ordered    04/30/23 1045  mupirocin 2 % ointment         05/05 0859 Nasl 2 times daily 04/30/23 0936    04/30/23 0100  clindamycin in D5W 600 mg/50 mL IVPB 600 mg         -- IV Every 8 hours (non-standard times) 04/29/23 2024          Lines and Day Number of Therapy:  PIV       Assessment:     Jonh Figueroa is a 33 y.o. male with: new onset DM uncontrolled w/o complications on insulin, normocytic anemia, suspected flow murmur, presents for sore throat 2 days and DKA. Pt's work up was notable for pH 7.31, BetaOH - 4.7, AG -18, with hyperglycemia 280, likely 2/2 peritonsillar abscess that was drained by ENT. Culture +strep. Pt admitted to [facility] and placed in ICU for insulin gtt and continued monitoring/IV clinda for tonsillar abscess. Stepped down on 4/30 after discontinuation of insulin ggt. Now presenting w/leukocytosis despite Abx so ID consulted.     Plan:     Insulin Dependent DM   DKA, resolved  - A1c 11  - Briefly in ICU for insulin ggt, now stepped down  - Levemir increased from 13U to 15U nightly  - BMP q4 to monitor bicarb  - On diabetic diet  - Consulted nutrition  - Insulin ab and glutamic acid ab pending    Left Peritonsillar abscess 2/2 Group B Strep  - CT soft tissue: 1.7x1.1 left tonsillar abscess    - ENT  drained abscess   - Culture positive for Group B Strep  - Completed dexamethasone 12mg taper   - Cont Chlorhexidine rinses   - Cont clinda IV 600mg q8   - Consulting ID for possible Abx regimen changes given WBC 15.5 despite Abx use  - will monitor need for pain medication PRN      Normocytic Anemia  -H/H 12.2/37.2 MCV 84 on 4/28  - continue to monitor      HCM   - Pt needs vacc Prevnar 20, tdap, flu, covid     Diet: Diabetic; soft  Code: FULL      Iglesia Salas M.D.  [facility] Internal Medicine PGY-1    [facility] Medicine Hospitalist Pager numbers:     [facility] Hospitalist Medicine Team B (Tiana/Gabrielle):  [phone]

## 2023-05-01 NOTE — PROGRESS NOTES
[facility]  Otorhinolaryngology-Head & Neck Surgery  Progress Note    Patient Name: Jonh Figueroa  MRN: [id number]  Code Status: Full Code  Admission Date: 4/29/2023  Hospital Length of Stay: 2 days  Attending Physician: Alvaro Anthony MD  Primary Care Provider: Primary Doctor No    Subjective:     Interval History: Continuing to do well.  Took PO without problem.  Throat pain continues to improve.  Trismus and left jaw pain improved.  Off insulin gtt and transferred to floor/tele.      Post-procedure Info:  Day 2 s/p bedside I&D left [facility]     Hospital Day: 3      Medications:  Continuous Infusions:   dextrose 5 % and 0.45 % NaCl       Scheduled Meds:   chlorhexidine  15 mL Mouth/Throat TID    clindamycin (CLEOCIN) IVPB  600 mg Intravenous Q8H    insulin detemir U-100  15 Units Subcutaneous QHS    LIDOcaine (PF) 10 mg/ml (1%)  10 mL Intradermal Once    mupirocin   Nasal BID     PRN Meds:benzocaine, dextrose 10%, dextrose 10%, dextrose 10%, dextrose 10%, dextrose 10%, dextrose, dextrose, dextrose 5 % and 0.45 % NaCl, glucagon (human recombinant), insulin aspart U-100, sodium chloride 0.9%     Objective:     Vital Signs (24h Range):  Temp:  [96.2 °F (35.7 °C)-98.2 °F (36.8 °C)] 96.4 °F (35.8 °C)  Pulse:  [] 74  Resp:  [12-18] 18  SpO2:  [96 %-100 %] 96 %  BP: (110-145)/(70-94) 119/73       Lines/Drains/Airways       Peripheral Intravenous Line  Duration                  Peripheral IV - Single Lumen 04/29/23 18 G Right Upper Arm 2 days         Peripheral IV - Single Lumen 04/30/23 0150 20 G Left;Posterior Hand 1 day                     Physical Exam  HENT:      Head: Normocephalic and atraumatic. No masses.      Jaw: Trismus (minimal, pain over left jaw with opening) present.      Salivary Glands: Right salivary gland is not diffusely enlarged or tender. Left salivary gland is not diffusely enlarged or tender.      Right Ear: Tympanic membrane, ear canal and external ear normal.      Left Ear: Tympanic  membrane, ear canal and external ear normal.      Nose: Mucosal edema (mild) present. No nasal deformity, septal deviation, congestion or rhinorrhea.      Right Nostril: No epistaxis.      Left Nostril: No epistaxis.      Right Turbinates: Not enlarged or swollen.      Left Turbinates: Not enlarged or swollen.      Right Sinus: No maxillary sinus tenderness or frontal sinus tenderness.      Left Sinus: No maxillary sinus tenderness or frontal sinus tenderness.      Mouth/Throat:      Lips: Pink.      Mouth: Mucous membranes are moist.      Dentition: Normal dentition. No gum lesions.      Tongue: No lesions. Tongue does not deviate from midline.      Palate: No mass.      Pharynx: Pharyngeal swelling (swelling over left tonsil, improved; no effacement of tonsillar pillar), posterior oropharyngeal erythema mostly resolved and uvula swelling (> on left, uvula more midline today; tissue edema improved) present.      Tonsils: Tonsillar abscess (s/p needle drainage and I&D on left; mild swelling still present, erythema, exudates improved) present. 1+ on the right. 2+ on the left.       Significant Labs:  BMP:   Recent Labs   Lab 04/29/23 1837 04/29/23 1956 05/01/23 0429   *   < > 307*      < > 102   CO2 12*   < > 17*   BUN 12   < > 15   CREATININE 1.3   < > 1.0   CALCIUM 9.5   < > 9.5   MG 1.8  --   --     < > = values in this interval not displayed.     CBC:   Recent Labs   Lab 05/01/23  0429   WBC 15.51*   RBC 4.16*   HGB 11.8*   HCT 34.0*      MCV 82   MCH 28.4   MCHC 34.7       Significant Diagnostics:  None    Assessment/Plan:     Active Diagnoses:    Diagnosis Date Noted POA    Peritonsillar abscess [J36] 04/29/2023 Yes    Pharyngitis due to Streptococcus species [J02.0] 04/29/2023 Yes    Tonsillitis [J03.90] 04/29/2023 Yes    DKA (diabetic ketoacidosis) [E11.10] 04/26/2023 Yes      Problems Resolved During this Admission:     Continue antibiotics and peridex rinses.  Would plan on minimum 2  week course of clindamycin.  May need probiotic in light of abx use over last month.     DKA/DM treatment per primary team.      Will continue to follow.     Aurea Diop MD  Otorhinolaryngology-Head & Neck Surgery  [place] [facility]

## 2023-05-02 ENCOUNTER — NURSE TRIAGE (OUTPATIENT)
Dept: ADMINISTRATIVE | Facility: CLINIC | Age: 33
End: 2023-05-02
Payer: MEDICAID

## 2023-05-02 VITALS
WEIGHT: 250 LBS | RESPIRATION RATE: 18 BRPM | DIASTOLIC BLOOD PRESSURE: 88 MMHG | HEIGHT: 73 IN | TEMPERATURE: 97 F | OXYGEN SATURATION: 98 % | SYSTOLIC BLOOD PRESSURE: 136 MMHG | HEART RATE: 79 BPM | BODY MASS INDEX: 33.13 KG/M2

## 2023-05-02 LAB
ANION GAP SERPL CALC-SCNC: 12 MMOL/L (ref 8–16)
BACTERIA SPEC AEROBE CULT: ABNORMAL
BACTERIA SPEC AEROBE CULT: ABNORMAL
BASOPHILS # BLD AUTO: 0.02 K/UL (ref 0–0.2)
BASOPHILS NFR BLD: 0.3 % (ref 0–1.9)
BUN SERPL-MCNC: 20 MG/DL (ref 6–20)
CALCIUM SERPL-MCNC: 8.9 MG/DL (ref 8.7–10.5)
CHLORIDE SERPL-SCNC: 100 MMOL/L (ref 95–110)
CO2 SERPL-SCNC: 23 MMOL/L (ref 23–29)
CREAT SERPL-MCNC: 1.4 MG/DL (ref 0.5–1.4)
DIFFERENTIAL METHOD: ABNORMAL
EOSINOPHIL # BLD AUTO: 0 K/UL (ref 0–0.5)
EOSINOPHIL NFR BLD: 0.1 % (ref 0–8)
ERYTHROCYTE [DISTWIDTH] IN BLOOD BY AUTOMATED COUNT: 12.7 % (ref 11.5–14.5)
EST. GFR  (NO RACE VARIABLE): >60 ML/MIN/1.73 M^2
GAD65 AB SER-SCNC: 0 NMOL/L
GLUCOSE SERPL-MCNC: 325 MG/DL (ref 70–110)
HCT VFR BLD AUTO: 36.6 % (ref 40–54)
HGB BLD-MCNC: 11.8 G/DL (ref 14–18)
IMM GRANULOCYTES # BLD AUTO: 0.02 K/UL (ref 0–0.04)
IMM GRANULOCYTES NFR BLD AUTO: 0.3 % (ref 0–0.5)
INSULIN AB SER-SCNC: 0 NMOL/L (ref 0–0.02)
LYMPHOCYTES # BLD AUTO: 3.3 K/UL (ref 1–4.8)
LYMPHOCYTES NFR BLD: 48 % (ref 18–48)
MCH RBC QN AUTO: 27.4 PG (ref 27–31)
MCHC RBC AUTO-ENTMCNC: 32.2 G/DL (ref 32–36)
MCV RBC AUTO: 85 FL (ref 82–98)
MONOCYTES # BLD AUTO: 0.6 K/UL (ref 0.3–1)
MONOCYTES NFR BLD: 8 % (ref 4–15)
NEUTROPHILS # BLD AUTO: 3 K/UL (ref 1.8–7.7)
NEUTROPHILS NFR BLD: 43.3 % (ref 38–73)
NRBC BLD-RTO: 0 /100 WBC
PHOSPHATE SERPL-MCNC: 3.6 MG/DL (ref 2.7–4.5)
PLATELET # BLD AUTO: 257 K/UL (ref 150–450)
PMV BLD AUTO: 11.3 FL (ref 9.2–12.9)
POCT GLUCOSE: 252 MG/DL (ref 70–110)
POCT GLUCOSE: 323 MG/DL (ref 70–110)
POTASSIUM SERPL-SCNC: 3.6 MMOL/L (ref 3.5–5.1)
RBC # BLD AUTO: 4.3 M/UL (ref 4.6–6.2)
SODIUM SERPL-SCNC: 135 MMOL/L (ref 136–145)
WBC # BLD AUTO: 6.9 K/UL (ref 3.9–12.7)

## 2023-05-02 PROCEDURE — 84100 ASSAY OF PHOSPHORUS: CPT | Performed by: STUDENT IN AN ORGANIZED HEALTH CARE EDUCATION/TRAINING PROGRAM

## 2023-05-02 PROCEDURE — 25000003 PHARM REV CODE 250: Performed by: STUDENT IN AN ORGANIZED HEALTH CARE EDUCATION/TRAINING PROGRAM

## 2023-05-02 PROCEDURE — 99024 PR POST-OP FOLLOW-UP VISIT: ICD-10-PCS | Mod: ,,, | Performed by: OTOLARYNGOLOGY

## 2023-05-02 PROCEDURE — 25000003 PHARM REV CODE 250: Performed by: OTOLARYNGOLOGY

## 2023-05-02 PROCEDURE — 85025 COMPLETE CBC W/AUTO DIFF WBC: CPT | Performed by: STUDENT IN AN ORGANIZED HEALTH CARE EDUCATION/TRAINING PROGRAM

## 2023-05-02 PROCEDURE — 36415 COLL VENOUS BLD VENIPUNCTURE: CPT | Performed by: STUDENT IN AN ORGANIZED HEALTH CARE EDUCATION/TRAINING PROGRAM

## 2023-05-02 PROCEDURE — 94761 N-INVAS EAR/PLS OXIMETRY MLT: CPT

## 2023-05-02 PROCEDURE — 99024 POSTOP FOLLOW-UP VISIT: CPT | Mod: ,,, | Performed by: OTOLARYNGOLOGY

## 2023-05-02 RX ORDER — CEPHALEXIN 500 MG/1
500 CAPSULE ORAL 4 TIMES DAILY
Qty: 44 CAPSULE | Refills: 0 | Status: SHIPPED | OUTPATIENT
Start: 2023-05-02 | End: 2023-05-13

## 2023-05-02 RX ORDER — INSULIN ASPART 100 [IU]/ML
6 INJECTION, SOLUTION INTRAVENOUS; SUBCUTANEOUS
Qty: 15 ML | Refills: 11 | Status: SHIPPED | OUTPATIENT
Start: 2023-05-02 | End: 2024-05-01

## 2023-05-02 RX ORDER — CHLORHEXIDINE GLUCONATE ORAL RINSE 1.2 MG/ML
15 SOLUTION DENTAL 3 TIMES DAILY
Qty: 473 ML | Refills: 0 | Status: SHIPPED | OUTPATIENT
Start: 2023-05-02 | End: 2023-05-16

## 2023-05-02 RX ORDER — GLUCAGON 1 MG
1 VIAL (EA) INJECTION
Qty: 1 EACH | Refills: 11 | Status: SHIPPED | OUTPATIENT
Start: 2023-05-02

## 2023-05-02 RX ADMIN — INSULIN ASPART 8 UNITS: 100 INJECTION, SOLUTION INTRAVENOUS; SUBCUTANEOUS at 08:05

## 2023-05-02 RX ADMIN — INSULIN ASPART 6 UNITS: 100 INJECTION, SOLUTION INTRAVENOUS; SUBCUTANEOUS at 12:05

## 2023-05-02 RX ADMIN — CEPHALEXIN 500 MG: 500 CAPSULE ORAL at 12:05

## 2023-05-02 RX ADMIN — CHLORHEXIDINE GLUCONATE 0.12% ORAL RINSE 15 ML: 1.2 LIQUID ORAL at 08:05

## 2023-05-02 RX ADMIN — CEPHALEXIN 500 MG: 500 CAPSULE ORAL at 08:05

## 2023-05-02 RX ADMIN — MUPIROCIN: 20 OINTMENT TOPICAL at 08:05

## 2023-05-02 NOTE — PLAN OF CARE
Virtual Nurse:Discharge orders noted; additional clinical references attached.  and pharmacy tech notified.  Patient's discharge instruction packet given by bedside RN.    Cued into patient's room.  Permission received per patient to turn camera to view patient.  Introduced as VN that will be instructing on discharge instructions. Educated patient on reason for admission; medications to hold, continue, and start, appointment to follow-up with doctor, and when to return to ED. Teach back method used. Verbalized understanding  Number given for 24/7 Nurse Line. Opportunity given for questions and questions answered.  Bedside nurse updated.       Problem: Diabetic Ketoacidosis  Goal: Fluid and Electrolyte Balance with Absence of Ketosis  5/2/2023 1333 by Clarice Lema RN  Outcome: Met  5/2/2023 0729 by Clarice Lema RN  Outcome: Ongoing, Progressing     Problem: Adult Inpatient Plan of Care  Goal: Plan of Care Review  5/2/2023 1333 by Clarice Lema RN  Outcome: Met  5/2/2023 0729 by Clarice Lema RN  Outcome: Ongoing, Progressing  Goal: Patient-Specific Goal (Individualized)  5/2/2023 1333 by Clarice Lema RN  Outcome: Met  5/2/2023 0729 by Clarice Lema RN  Outcome: Ongoing, Progressing  Goal: Absence of Hospital-Acquired Illness or Injury  5/2/2023 1333 by Clarice Lema RN  Outcome: Met  5/2/2023 0729 by Clarice Lema RN  Outcome: Ongoing, Progressing  Goal: Optimal Comfort and Wellbeing  5/2/2023 1333 by Clarice Lema RN  Outcome: Met  5/2/2023 0729 by Clarice Lema RN  Outcome: Ongoing, Progressing  Goal: Readiness for Transition of Care  5/2/2023 1333 by Clarice Lema RN  Outcome: Met  5/2/2023 0729 by Clarice Lema RN  Outcome: Ongoing, Progressing     Problem: Diabetes Comorbidity  Goal: Blood Glucose Level Within Targeted Range  5/2/2023 1333 by Clarice Lema RN  Outcome: Met  5/2/2023 0729 by Clarice Lema RN  Outcome: Ongoing, Progressing     Problem: Infection  Goal: Absence of  Infection Signs and Symptoms  5/2/2023 1333 by Clarice Lema RN  Outcome: Met  5/2/2023 0729 by Clarice Lema RN  Outcome: Ongoing, Progressing

## 2023-05-02 NOTE — PLAN OF CARE
[facility] - Telemetry  Initial Discharge Assessment       Primary Care Provider: Primary Doctor No    Admission Diagnosis: DKA (diabetic ketoacidosis) [E11.10]  DKA, type 2 [E11.10]    Admission Date: 4/29/2023  Expected Discharge Date: 5/2/2023    Discharge Barriers Identified: (P) None    Payor: MEDICAID / Plan: PENDING MEDICAID / Product Type: Government /     Extended Emergency Contact Information  Primary Emergency Contact: Rayna Figueroa  Mobile Phone: [phone]  Relation: Mother   needed? No  Secondary Emergency Contact: BEA CONN  Mobile Phone: [phone]  Relation: Significant other  Preferred language: English   needed? No    Discharge Plan A: (P) Home         [id number] - [place] - [address]  [place] [address]  Phone: [phone] Fax: [phone]      Initial Assessment (most recent)       Adult Discharge Assessment - 05/02/23 1125          Discharge Assessment    Assessment Type Discharge Planning Assessment (P)      Confirmed/corrected address, phone number and insurance Yes (P)      Source of Information health record;patient (P)      Communicated WALTER with patient/caregiver Yes (P)      People in Home alone (P)      Do you expect to return to your current living situation? Yes (P)      Prior to hospitilization cognitive status: Alert/Oriented;No Deficits (P)      Current cognitive status: Alert/Oriented;No Deficits (P)      Equipment Currently Used at Home none (P)      Readmission within 30 days? No (P)      Patient currently being followed by outpatient case management? No (P)      Do you currently have service(s) that help you manage your care at home? No (P)      Are you on dialysis? No (P)      Do you take coumadin? No (P)      Discharge Plan A Home (P)      DME Needed Upon Discharge  glucometer (P)    CBG supplies    Discharge Plan discussed with: Patient (P)      Discharge Barriers Identified None (P)                 "    Future Appointments   Date Time Provider Department Center   5/16/2023  1:30 PM Becky Calle MD [facility]     /88 (BP Location: Left arm, Patient Position: Lying)   Pulse 79   Temp 97.1 °F (36.2 °C) (Oral)   Resp 18   Ht 6' 0.99" (1.854 m)   Wt 113.4 kg (249 lb 15.7 oz)   SpO2 98%   BMI 32.99 kg/m²      cephALEXin  500 mg Oral QID    chlorhexidine  15 mL Mouth/Throat TID    insulin detemir U-100  15 Units Subcutaneous QHS    LIDOcaine (PF) 10 mg/ml (1%)  10 mL Intradermal Once    mupirocin   Nasal BID                  "

## 2023-05-02 NOTE — PLAN OF CARE
VIRTUAL NURSE:  Labs, notes, orders, and careplan reviewed. VN to be available as needed.    Problem: Diabetic Ketoacidosis  Goal: Fluid and Electrolyte Balance with Absence of Ketosis  Outcome: Ongoing, Progressing     Problem: Adult Inpatient Plan of Care  Goal: Plan of Care Review  Outcome: Ongoing, Progressing  Goal: Patient-Specific Goal (Individualized)  Outcome: Ongoing, Progressing  Goal: Absence of Hospital-Acquired Illness or Injury  Outcome: Ongoing, Progressing  Goal: Optimal Comfort and Wellbeing  Outcome: Ongoing, Progressing  Goal: Readiness for Transition of Care  Outcome: Ongoing, Progressing     Problem: Diabetes Comorbidity  Goal: Blood Glucose Level Within Targeted Range  Outcome: Ongoing, Progressing

## 2023-05-02 NOTE — DISCHARGE SUMMARY
[facility] Hospital Medicine Discharge Summary    Primary Team: [facility] Hospitalist Team B  Attending Physician: Lewis Montiel MD  Resident: Brian  Intern: Maritza    Date of Admit: 4/29/2023  Date of Discharge: 5/2/2023    Discharge to: Home  Condition: Stable    Discharge Diagnoses     Patient Active Problem List   Diagnosis    DKA (diabetic ketoacidosis)    Peritonsillar abscess    Pharyngitis due to Streptococcus species    Tonsillitis       Consultants and Procedures     Consultants:  ENT  ID    Procedures:   Incision and Drainage of Left Peritonsillar Abscess    Imaging:    CT Soft Tissue Neck With Contrast  Result Date: 4/29/2023  Diffuse enlargement of the left palatine tonsil noting internal rim enhancing fluid collections concerning for abscess.  Correlation and follow-up is advised.  There is leftward mass effect upon the airway without airway occlusion.  Continued follow-up is recommended. 2. Please see above for additional findings.      X-Ray Chest AP Portable  Result Date: 4/29/2023     Echo  Result Date: 4/28/2023  The left ventricle is normal in size with concentric remodeling and normal systolic function. · The estimated ejection fraction is 55%. · Normal left ventricular diastolic function. · Normal right ventricular size with normal right ventricular systolic function. · The estimated PA systolic pressure is 24 mmHg. · Normal central venous pressure (3 mmHg).      Brief History of Present Illness      Jonh Figueroa is a 33 y.o. male with: new onset DM uncontrolled w/o complications on insulin, normocytic anemia, suspected flow murmur, presents for sore throat 2 days and DKA. Found to have a left peritonsillar abscess on CT soft tissue imaging so ENT consulted who performed an I&D. Culture grew group B strep. Pt tolerating solids and liquids at this time. Treated with IV Clindamycin and then transitioned to Keflex and will complete 2 week course. Pt's work up additionally notable for pH 7.31, BetaOH - 4.7, AG  -18, with hyperglycemia 280 so was briefly in the ICU for insulin ggt. Stepped down on  and pt's bicarb upon discharge was 23. Insulin regimen adjusted and pt to follow with endocrine for management of possible new onset Type 1 diabetes.    For the full HPI please refer to the History & Physical from this admission.    Last 24 Hour Vital Signs:  BP  Min: 119/73  Max: 141/85  Temp  Av.1 °F (36.2 °C)  Min: 96.4 °F (35.8 °C)  Max: 98 °F (36.7 °C)  Pulse  Av  Min: 67  Max: 97  Resp  Av  Min: 16  Max: 20  SpO2  Av.7 %  Min: 96 %  Max: 100 %    Intake/Output Summary (Last 24 hours) at 2023 0657  Last data filed at 2023 2155  Gross per 24 hour   Intake 75 ml   Output --   Net 75 ml      Physical Examination:  Physical Exam  Constitutional:       Appearance: He is normal weight.   HENT:      Mouth/Throat:      Mouth: Mucous membranes are moist.      Pharynx: Oropharynx is clear.   Eyes:      Pupils: Pupils are equal, round, and reactive to light.   Neck:      Comments: No tonsillar swelling noted. Well healing.  Cardiovascular:      Rate and Rhythm: Normal rate and regular rhythm.      Heart sounds: Normal heart sounds.   Pulmonary:      Effort: Pulmonary effort is normal. No respiratory distress.      Breath sounds: Normal breath sounds. No wheezing.   Abdominal:      General: Bowel sounds are normal. There is no distension.      Palpations: Abdomen is soft.      Tenderness: There is no abdominal tenderness.   Musculoskeletal:      Right lower leg: No edema.      Left lower leg: No edema.   Lymphadenopathy:      Cervical: No cervical adenopathy.   Skin:     Capillary Refill: Capillary refill takes less than 2 seconds.   Neurological:      General: No focal deficit present.      Mental Status: He is alert and oriented to person, place, and time.     Hospital Course By Problem with Pertinent Findings     Insulin Dependent DM   DKA, resolved  - A1c 11  - Briefly in ICU for insulin ggt, now  "stepped down  - Bicarb improved to 23  - Devemir increased to 18U nightly  - Added mealtime 6U aspart  - Provided glucagon  - Consulted nutrition who discussed diabetic diet with pt  - Insulin ab pending, glutamic acid ab 0  - Arranged endocrine follow up     Left Peritonsillar abscess 2/2 Group B Strep  - CT soft tissue: 1.7x1.1 left tonsillar abscess    - ENT drained abscess   - Culture positive for Group B Strep  - Completed dexamethasone 12mg taper on 4/30  - Consulted ID for possible Abx regimen changes given WBC 15.5 despite Abx use: states leukocytosis likely residual effect from steroid use and can transition Clindamycin to Keflex 500mg QID  - Leukocytosis resolved  - Continue Keflex 500mg QID for 2 weeks  - Continue Peridex rinses     Normocytic Anemia  -H/H 12.2/37.2 MCV 84 on 4/28  - F/up with PCP     HCM   - Pt needs vacc Prevnar 20, tdap, flu, covid  - F/up with PCP    Discharge Medications        Medication List        START taking these medications      cephALEXin 500 MG capsule  Commonly known as: KEFLEX  Take 1 capsule (500 mg total) by mouth 4 (four) times daily. for 11 days     chlorhexidine 0.12 % solution  Commonly known as: PERIDEX  Use as directed 15 mLs in the mouth or throat 3 (three) times daily     glucagon HCL 1 mg Solr  Commonly known as: GLUCAGON (HCL) EMERGENCY KIT  Inject 1 mg as directed as needed (Administer if blood glucose <50, if unconscious, if siezing).     insulin aspart U-100 100 unit/mL injection  Commonly known as: NovoLOG  Inject 6 Units into the skin 3 (three) times daily before meals.            CHANGE how you take these medications      insulin detemir U-100 (Levemir) 100 unit/mL (3 mL) Inpn pen  Inject 18 Units into the skin every evening.  What changed: how much to take            CONTINUE taking these medications      BD ULTRA-FINE PARK PEN NEEDLE 32 gauge x 5/32" Ndle  Generic drug: pen needle, diabetic  use as directed to inject insulin     TRUE METRIX GLUCOSE " METER Misc  Generic drug: blood-glucose meter  use as directed     TRUE METRIX GLUCOSE TEST STRIP Strp  Generic drug: blood sugar diagnostic  use as directed to check blood sugar every morning after meal and every evening     TRUEPLUS LANCETS 30 gauge Misc  Generic drug: lancets  use as directed to check blood sugar every morning after meal and every evening               Where to Get Your Medications        These medications were sent to [facility]  [address], [place] [address]      Hours: Mon-Fri, 8a-5:30p Phone: [phone]   chlorhexidine 0.12 % solution       These medications were sent to [facility] - [place], [place] - [address] [facility]  [address] [place] [address]      Phone: [phone]   cephALEXin 500 MG capsule  glucagon HCL 1 mg Solr  insulin aspart U-100 100 unit/mL injection  insulin detemir U-100 (Levemir) 100 unit/mL (3 mL) Inpn pen         Discharge Information:     Diet:  Diabetic    Physical Activity:  As tolerated             Instructions:  1. Take all medications as prescribed  2. Keep all follow-up appointments  3. Return to the hospital or call your primary care physicians if any worsening symptoms such as fever, chest pain, shortness of breath, return of symptoms, or any other concerns.    Follow-Up Appointments:  Endocrine  PCP    Iglesia Salas MD  [facility] Internal Medicine -I  [facility] Internal Medicine Team B

## 2023-05-02 NOTE — TELEPHONE ENCOUNTER
"Patient states he was discharged today from [facility] and is at the Pharmacy for medication . Patient states his prescriptions are ready at this time and is calling to obtain his new Medicaid Insurance number. Patient states an application for Medicaid was completed with an [facility]  while inpatient. Patient's Insurance coverage in Epic notes that patient's insurance coverage is "Pending".    Triage RN contacted patient's Inpatient , Harleen Dwyer RN via Secure Chat and advised of patient's request for Medicaid Number and prescriptions that are awaiting pickup. Ms. Dwyer advised the following:    "He can come back to the pharmacy at [facility] to  but he has only been applied for Medicaid. It is not active."    Patient advised to return to [facility] to  his medications due to pending status of his application for Medicaid. Patient states understanding and states that he has received a call from Harleen Dwyer RN that advised him to return to [facility] for  of post-discharge prescriptions.               Reason for Disposition   Nursing judgment    Additional Information   Negative: Nursing judgment   Negative: Nursing judgment   Negative: Nursing judgment    Protocols used: Information Only Call - No Triage-A-OH    "

## 2023-05-02 NOTE — PLAN OF CARE
"[facility] - Telemetry  Discharge Final Note    Primary Care Provider: Primary Doctor No    Expected Discharge Date: 5/2/2023    Pharmacist will go over home medications and reasons for medications. VN and bedside nurse to reiterate final discharge instructions.     Cleared from CM . Bedside Nurse and VN notified.      Final Discharge Note (most recent)       Final Note - 05/02/23 1128          Final Note    Assessment Type Final Discharge Note (P)      Anticipated Discharge Disposition Home or Self Care (P)      Hospital Resources/Appts/Education Provided Appointments scheduled and added to AVS (P)         Post-Acute Status    Post-Acute Authorization Other (P)      Other Status No Post-Acute Service Needs (P)      Discharge Delays None known at this time (P)                    Future Appointments   Date Time Provider Department Center   5/16/2023  1:30 PM Becky Calle MD [facility]     /88 (BP Location: Left arm, Patient Position: Lying)   Pulse 79   Temp 97.1 °F (36.2 °C) (Oral)   Resp 18   Ht 6' 0.99" (1.854 m)   Wt 113.4 kg (249 lb 15.7 oz)   SpO2 98%   BMI 32.99 kg/m²       Contact Info       Aurea Diop MD   Specialty: Otolaryngology    [address]  [address]  [place] [address]   Phone: [phone]       Next Steps: Schedule an appointment as soon as possible for a visit in 1 week(s)             Medication List        START taking these medications      cephALEXin 500 MG capsule  Commonly known as: KEFLEX  Take 1 capsule (500 mg total) by mouth 4 (four) times daily. for 11 days     chlorhexidine 0.12 % solution  Commonly known as: PERIDEX  Use as directed 15 mLs in the mouth or throat 3 (three) times daily     glucagon HCL 1 mg Solr  Commonly known as: GLUCAGON (HCL) EMERGENCY KIT  Inject 1 mg as directed as needed (Administer if blood glucose <50, if unconscious, if siezing).     insulin aspart U-100 100 unit/mL injection  Commonly known as: NovoLOG  Inject 6 Units into " "the skin 3 (three) times daily before meals.            CHANGE how you take these medications      insulin detemir U-100 (Levemir) 100 unit/mL (3 mL) Inpn pen  Inject 18 Units into the skin every evening.  What changed: how much to take            CONTINUE taking these medications      BD ULTRA-FINE PARK PEN NEEDLE 32 gauge x 5/32" Ndle  Generic drug: pen needle, diabetic  use as directed to inject insulin     TRUE METRIX GLUCOSE METER Misc  Generic drug: blood-glucose meter  use as directed     TRUE METRIX GLUCOSE TEST STRIP Strp  Generic drug: blood sugar diagnostic  use as directed to check blood sugar every morning after meal and every evening     TRUEPLUS LANCETS 30 gauge Misc  Generic drug: lancets  use as directed to check blood sugar every morning after meal and every evening               Where to Get Your Medications        These medications were sent to [facility] [place]  [address] [place] [address]      Hours: Mon-Fri, 8a-5:30p Phone: [phone]   chlorhexidine 0.12 % solution       These medications were sent to [facility]  [place]  [address]  [address] [place] [address]      Phone: [phone]   cephALEXin 500 MG capsule  glucagon HCL 1 mg Solr  insulin aspart U-100 100 unit/mL injection  insulin detemir U-100 (Levemir) 100 unit/mL (3 mL) Inpn pen         "

## 2023-05-02 NOTE — PROGRESS NOTES
"[facility]                    Pharmacy       Discharge Medication Education    Patient ACCEPTED medication education. Pharmacy has provided education on the name, indication, and possible side effects of the medication(s) prescribed, using teach-back method.     The following medications have also been discussed, during this admission.        Medication List        START taking these medications      cephALEXin 500 MG capsule  Commonly known as: KEFLEX  Take 1 capsule (500 mg total) by mouth 4 (four) times daily. for 11 days     chlorhexidine 0.12 % solution  Commonly known as: PERIDEX  Use as directed 15 mLs in the mouth or throat 3 (three) times daily     glucagon HCL 1 mg Solr  Commonly known as: GLUCAGON (HCL) EMERGENCY KIT  Inject 1 mg as directed as needed (Administer if blood glucose <50, if unconscious, if siezing).     insulin aspart U-100 100 unit/mL injection  Commonly known as: NovoLOG  Inject 6 Units into the skin 3 (three) times daily before meals.            CHANGE how you take these medications      insulin detemir U-100 (Levemir) 100 unit/mL (3 mL) Inpn pen  Inject 18 Units into the skin every evening.  What changed: how much to take            CONTINUE taking these medications      BD ULTRA-FINE PARK PEN NEEDLE 32 gauge x 5/32" Ndle  Generic drug: pen needle, diabetic  use as directed to inject insulin     TRUE METRIX GLUCOSE METER Misc  Generic drug: blood-glucose meter  use as directed     TRUE METRIX GLUCOSE TEST STRIP Strp  Generic drug: blood sugar diagnostic  use as directed to check blood sugar every morning after meal and every evening     TRUEPLUS LANCETS 30 gauge Misc  Generic drug: lancets  use as directed to check blood sugar every morning after meal and every evening               Where to Get Your Medications        These medications were sent to [facility] [place]  [address], [place] [address]      Hours: Mon-Fri, 8a-5:30p Phone: [phone] "   chlorhexidine 0.12 % solution       These medications were sent to [id number] - [place] - [address] [facility]  [address] [place] [address]      Phone: [phone]   cephALEXin 500 MG capsule  glucagon HCL 1 mg Solr  insulin aspart U-100 100 unit/mL injection  insulin detemir U-100 (Levemir) 100 unit/mL (3 mL) Inpn pen          Thank you  Jesusita Márquez, PharmD  [phone]

## 2023-05-02 NOTE — PLAN OF CARE
1900 Pt appeared to be in no distress. Reported no pain.     2100 Accu Ck: 266 covered w/3 units aspart & 15 levemir    Tele: NS, HR 85, No alarms.     Bed in lowest position, wheels locked, non skid socks, ID band worn, personal items and call bell with in reach, bed alarm set.

## 2023-05-02 NOTE — PROGRESS NOTES
[facility]  Otorhinolaryngology-Head & Neck Surgery  Progress Note    Patient Name: Jonh Figueroa  MRN: [id number]  Code Status: Full Code  Admission Date: 4/29/2023  Hospital Length of Stay: 3 days  Attending Physician: Lewis Montiel MD  Primary Care Provider: Primary Doctor No    Subjective:     Interval History:  Patient continues to improve.  Throat, vocalization, swallowing better.  Cultures returned strep agalactiae, infectious disease saw patient and changed to Keflex.    Post-procedure Info:  Day 3 Status post left peritonsillar abscess drainage     Hospital Day: 4      Medications:  Continuous Infusions:   dextrose 5 % and 0.45 % NaCl       Scheduled Meds:   cephALEXin  500 mg Oral QID    chlorhexidine  15 mL Mouth/Throat TID    insulin detemir U-100  15 Units Subcutaneous QHS    LIDOcaine (PF) 10 mg/ml (1%)  10 mL Intradermal Once    mupirocin   Nasal BID     PRN Meds:benzocaine, dextrose 10%, dextrose 10%, dextrose 10%, dextrose 10%, dextrose 10%, dextrose, dextrose, dextrose 5 % and 0.45 % NaCl, glucagon (human recombinant), insulin aspart U-100, sodium chloride 0.9%     Objective:     Vital Signs (24h Range):  Temp:  [96.8 °F (36 °C)-98 °F (36.7 °C)] 96.8 °F (36 °C)  Pulse:  [67-97] 78  Resp:  [16-20] 18  SpO2:  [96 %-100 %] 97 %  BP: (123-141)/(63-85) 136/74       Lines/Drains/Airways       Peripheral Intravenous Line  Duration                  Peripheral IV - Single Lumen 04/29/23 18 G Right Upper Arm 3 days                  Physical Exam  HENT:      Head: Normocephalic and atraumatic. No masses.      Jaw:  Trismus resolved.      Salivary Glands: Right salivary gland is not diffusely enlarged or tender. Left salivary gland is not diffusely enlarged or tender.      Right Ear: Tympanic membrane, ear canal and external ear normal.      Left Ear: Tympanic membrane, ear canal and external ear normal.      Nose: Mucosal edema (mild) present. No nasal deformity, septal deviation, congestion or  rhinorrhea.      Right Nostril: No epistaxis.      Left Nostril: No epistaxis.      Right Turbinates: Not enlarged or swollen.      Left Turbinates: Not enlarged or swollen.      Right Sinus: No maxillary sinus tenderness or frontal sinus tenderness.      Left Sinus: No maxillary sinus tenderness or frontal sinus tenderness.      Mouth/Throat:      Lips: Pink.      Mouth: Mucous membranes are moist.      Dentition: Normal dentition. No gum lesions.      Tongue: No lesions. Tongue does not deviate from midline.      Palate: No mass.      Pharynx: Pharyngeal swelling resolved, no posterior oropharyngeal erythema resolved, and uvula swelling only minimal.      Tonsils: Tonsillar abscess (s/p needle drainage and I&D on left; only mild swelling still present, erythema, exudates improved) present. 1+ on the right. 2+ on the left.       Significant Labs:  Microbiology Results (last 7 days)       Procedure Component Value Units Date/Time    Aerobic culture (Specify Source) **CANNOT BE ORDERED AS STAT** [[id number]]  (Abnormal) Collected: 04/29/23 1842    Order Status: Completed Specimen: Abscess from Tonsil, Left Updated: 05/01/23 0752     Aerobic Bacterial Culture STREPTOCOCCUS AGALACTIAE (GROUP B)  Moderate  Beta-hemolytic streptococci are routinely susceptible to   penicillins,cephalosporins and carbapenems.  Susceptibility testing not routinely performed  No other significant isolate      Aerobic culture [[id number]]  (Abnormal) Collected: 04/29/23 2305    Order Status: Completed Specimen: Abscess from Tonsil, Left Updated: 05/01/23 0752     Aerobic Bacterial Culture STREPTOCOCCUS AGALACTIAE (GROUP B)  Moderate  Beta-hemolytic streptococci are routinely susceptible to   penicillins,cephalosporins and carbapenems.  Susceptibility testing not routinely performed  No other significant isolate      Culture, Anaerobe [[id number]] Collected: 04/29/23 1842    Order Status: Completed Specimen: Abscess from Tonsil, Left Updated:  05/01/23 0720     Anaerobic Culture Culture in progress    Culture, Anaerobe [[id number]] Collected: 04/29/23 2305    Order Status: Completed Specimen: Abscess from Tonsil, Left Updated: 05/01/23 0720     Anaerobic Culture Culture in progress    Group A Strep, Molecular [[id number]] Collected: 04/29/23 1251    Order Status: Completed Specimen: Throat Updated: 04/29/23 1311     Group A Strep, Molecular Negative     Comment: Arcanobacterium haemolyticum and Beta Streptococcus group C   and G will not be detected by this test method.  Please order   Throat Culture ([id number]) if suspected.                 Significant Diagnostics:  None    Assessment/Plan:     Active Diagnoses:    Diagnosis Date Noted POA    Peritonsillar abscess [J36] 04/29/2023 Yes    Pharyngitis due to Streptococcus species [J02.0] 04/29/2023 Yes    Tonsillitis [J03.90] 04/29/2023 Yes    DKA (diabetic ketoacidosis) [E11.10] 04/26/2023 Yes      Problems Resolved During this Admission:     Agree with 2 week course of Keflex for treatment of strep species causing peritonsillar abscess.  Continue Peridex rinses upon discharge.    Patient should follow-up with ENT in 2-3 weeks after discharge for repeat examination.    Aurea Diop MD  Otorhinolaryngology-Head & Neck Surgery  [place] [facility]

## 2023-05-03 ENCOUNTER — PATIENT OUTREACH (OUTPATIENT)
Dept: ADMINISTRATIVE | Facility: CLINIC | Age: 33
End: 2023-05-03
Payer: MEDICAID

## 2023-05-03 NOTE — PROGRESS NOTES
C3 nurse spoke with Jonh Figueroa  for a TCC post hospital discharge follow up call. The patient has a scheduled HOSFU appointment with Frandy Bethea on 5/11/23 @ 1100.

## 2023-05-04 ENCOUNTER — TELEPHONE (OUTPATIENT)
Dept: OTOLARYNGOLOGY | Facility: CLINIC | Age: 33
End: 2023-05-04
Payer: MEDICAID

## 2023-05-04 DIAGNOSIS — J36 PERITONSILLAR ABSCESS: ICD-10-CM

## 2023-05-04 DIAGNOSIS — J36 PERITONSILLAR ABSCESS: Primary | ICD-10-CM

## 2023-05-04 LAB
BACTERIA SPEC ANAEROBE CULT: ABNORMAL
BACTERIA SPEC ANAEROBE CULT: NORMAL

## 2023-05-04 RX ORDER — METRONIDAZOLE 500 MG/1
500 TABLET ORAL EVERY 12 HOURS
Qty: 28 TABLET | Refills: 0 | Status: SHIPPED | OUTPATIENT
Start: 2023-05-04 | End: 2023-05-04 | Stop reason: SDUPTHER

## 2023-05-04 RX ORDER — METRONIDAZOLE 500 MG/1
500 TABLET ORAL EVERY 12 HOURS
Qty: 28 TABLET | Refills: 0 | Status: SHIPPED | OUTPATIENT
Start: 2023-05-04 | End: 2023-05-19

## 2023-05-04 NOTE — TELEPHONE ENCOUNTER
Attempted to contact the pt, per Dr. Diop, to inform that an additional medication was sent to pharmacy due to culture report from inpatient stay. Also left detailed apt date/time/location information. No answer, voicemail was left.

## 2023-05-11 ENCOUNTER — OFFICE VISIT (OUTPATIENT)
Dept: HOME HEALTH SERVICES | Facility: CLINIC | Age: 33
End: 2023-05-11
Payer: MEDICAID

## 2023-05-11 DIAGNOSIS — E66.9 OBESITY (BMI 30.0-34.9): ICD-10-CM

## 2023-05-11 DIAGNOSIS — J36 PERITONSILLAR ABSCESS: ICD-10-CM

## 2023-05-11 DIAGNOSIS — E13.10 DIABETIC KETOACIDOSIS WITHOUT COMA ASSOCIATED WITH OTHER SPECIFIED DIABETES MELLITUS: Primary | ICD-10-CM

## 2023-05-11 PROCEDURE — 99401 PREV MED CNSL INDIV APPRX 15: CPT | Mod: S$GLB,,, | Performed by: NURSE PRACTITIONER

## 2023-05-11 PROCEDURE — 99213 OFFICE O/P EST LOW 20 MIN: CPT | Mod: 25,S$GLB,, | Performed by: NURSE PRACTITIONER

## 2023-05-11 PROCEDURE — 99401 PR PREVENT COUNSEL,INDIV,15 MIN: ICD-10-PCS | Mod: S$GLB,,, | Performed by: NURSE PRACTITIONER

## 2023-05-11 PROCEDURE — 99213 PR OFFICE/OUTPT VISIT, EST, LEVL III, 20-29 MIN: ICD-10-PCS | Mod: 25,S$GLB,, | Performed by: NURSE PRACTITIONER

## 2023-05-15 VITALS
OXYGEN SATURATION: 98 % | HEART RATE: 82 BPM | SYSTOLIC BLOOD PRESSURE: 124 MMHG | RESPIRATION RATE: 14 BRPM | DIASTOLIC BLOOD PRESSURE: 80 MMHG | TEMPERATURE: 98 F | BODY MASS INDEX: 33.72 KG/M2 | WEIGHT: 249 LBS | HEIGHT: 72 IN

## 2023-05-15 PROBLEM — E66.811 OBESITY (BMI 30.0-34.9): Status: ACTIVE | Noted: 2023-05-15

## 2023-05-15 PROBLEM — E66.9 OBESITY (BMI 30.0-34.9): Status: ACTIVE | Noted: 2023-05-15

## 2023-05-15 NOTE — PROGRESS NOTES
[facility]  Transition of Care Home Visit    Visit Date: 5/11/2023  Encounter Provider: Frandy Bethea   PCP:  Primary Doctor No    PRESENTING HISTORY      Patient ID: Jonh Figueroa is a 33 y.o. male.    Consult Requested By:  No ref. provider found  Reason for Consult:  Hospital Follow Up.    Jonh is being seen at home due to being seen at home due to physical debility that presents a taxing effort to leave the home, to mitigate high risk of hospital readmission and/or due to the limited availability of reliable or safe options for transportation to the point of access to the provider. Prior to treatment on this visit the chart was reviewed and patient verbal consent was obtained.      Chief Complaint: Transitional Care        History of Present Illness: Mr. Jonh Figueroa is a 33 y.o. male who was recently admitted to the hospital.    Discharge Diagnoses          Patient Active Problem List   Diagnosis    DKA (diabetic ketoacidosis)    Peritonsillar abscess    Pharyngitis due to Streptococcus species    Tonsillitis         Consultants and Procedures      Consultants:  ENT  ID     Procedures:   Incision and Drainage of Left Peritonsillar Abscess     Imaging:     CT Soft Tissue Neck With Contrast  Result Date: 4/29/2023  Diffuse enlargement of the left palatine tonsil noting internal rim enhancing fluid collections concerning for abscess.  Correlation and follow-up is advised.  There is leftward mass effect upon the airway without airway occlusion.  Continued follow-up is recommended. 2. Please see above for additional findings.      X-Ray Chest AP Portable  Result Date: 4/29/2023     Echo  Result Date: 4/28/2023  The left ventricle is normal in size with concentric remodeling and normal systolic function. · The estimated ejection fraction is 55%. · Normal left ventricular diastolic function. · Normal right ventricular size with normal right ventricular systolic function. · The estimated PA systolic pressure is  24 mmHg. · Normal central venous pressure (3 mmHg).       Brief History of Present Illness      Jonh Figueroa is a 33 y.o. male with: new onset DM uncontrolled w/o complications on insulin, normocytic anemia, suspected flow murmur, presents for sore throat 2 days and DKA. Found to have a left peritonsillar abscess on CT soft tissue imaging so ENT consulted who performed an I&D. Culture grew group B strep. Pt tolerating solids and liquids at this time. Treated with IV Clindamycin and then transitioned to Keflex and will complete 2 week course. Pt's work up additionally notable for pH 7.31, BetaOH - 4.7, AG -18, with hyperglycemia 280 so was briefly in the ICU for insulin ggt. Stepped down on  and pt's bicarb upon discharge was 23. Insulin regimen adjusted and pt to follow with endocrine for management of possible new onset Type 1 diabetes.     For the full HPI please refer to the History & Physical from this admission.     Last 24 Hour Vital Signs:  BP  Min: 119/73  Max: 141/85  Temp  Av.1 °F (36.2 °C)  Min: 96.4 °F (35.8 °C)  Max: 98 °F (36.7 °C)  Pulse  Av  Min: 67  Max: 97  Resp  Av  Min: 16  Max: 20  SpO2  Av.7 %  Min: 96 %  Max: 100 %     Intake/Output Summary (Last 24 hours) at 2023 0657  Last data filed at 2023 2155      Gross per 24 hour   Intake 75 ml   Output --   Net 75 ml      Physical Examination:  Physical Exam  Constitutional:       Appearance: He is normal weight.   HENT:      Mouth/Throat:      Mouth: Mucous membranes are moist.      Pharynx: Oropharynx is clear.   Eyes:      Pupils: Pupils are equal, round, and reactive to light.   Neck:      Comments: No tonsillar swelling noted. Well healing.  Cardiovascular:      Rate and Rhythm: Normal rate and regular rhythm.      Heart sounds: Normal heart sounds.   Pulmonary:      Effort: Pulmonary effort is normal. No respiratory distress.      Breath sounds: Normal breath sounds. No wheezing.   Abdominal:      General: Bowel  sounds are normal. There is no distension.      Palpations: Abdomen is soft.      Tenderness: There is no abdominal tenderness.   Musculoskeletal:      Right lower leg: No edema.      Left lower leg: No edema.   Lymphadenopathy:      Cervical: No cervical adenopathy.   Skin:     Capillary Refill: Capillary refill takes less than 2 seconds.   Neurological:      General: No focal deficit present.      Mental Status: He is alert and oriented to person, place, and time.      Hospital Course By Problem with Pertinent Findings      Insulin Dependent DM   DKA, resolved  - A1c 11  - Briefly in ICU for insulin ggt, now stepped down  - Bicarb improved to 23  - Devemir increased to 18U nightly  - Added mealtime 6U aspart  - Provided glucagon  - Consulted nutrition who discussed diabetic diet with pt  - Insulin ab pending, glutamic acid ab 0  - Arranged endocrine follow up     Left Peritonsillar abscess 2/2 Group B Strep  - CT soft tissue: 1.7x1.1 left tonsillar abscess    - ENT drained abscess   - Culture positive for Group B Strep  - Completed dexamethasone 12mg taper on 4/30  - Consulted ID for possible Abx regimen changes given WBC 15.5 despite Abx use: states leukocytosis likely residual effect from steroid use and can transition Clindamycin to Keflex 500mg QID  - Leukocytosis resolved  - Continue Keflex 500mg QID for 2 weeks  - Continue Peridex rinses     Normocytic Anemia  -H/H 12.2/37.2 MCV 84 on 4/28  - F/up with PCP     HCM   - Pt needs vacc Prevnar 20, tdap, flu, covid  - F/up with PCP  ___________________________________________________________________    Today: With this visit today patient is found ambulating around his home, alone for the visit. Patient is AAOx3.  Does not need home health at this time.  Blood glucose reported 150-250.  Endorses transportation to all outside appointments.  Med reconciliation done.  Patient compliant with medications. Denies falls.  Denies smoking, alcohol abuse, illicit drug use.  Patient endorses ability to perform ADls. Endorses eating x 3 meals per day, daily BMs, and adequate sleep pattern. No additional needs at this this time. All of my information was given to the patient and family if any questions or concerns arise.     VSS. Denies fever, chest pain, shortness of breath, nausea, vomiting, diarrhea. Risks of environmental exposure to coronavirus discussed including: social distancing, hand hygiene, and limiting departures from the home for necessities only.  Reports understanding and willingness to comply.  All hospital discharge orders reviewed and being followed, all medications reconciled and reviewed, patient and family verbalized understanding. No other needs identified at this time.     Review of Systems   Constitutional:  Negative for activity change and appetite change.   HENT:  Negative for congestion and dental problem.    Eyes:  Negative for discharge and itching.   Respiratory:  Negative for choking and chest tightness.    Cardiovascular:  Negative for chest pain and palpitations.   Gastrointestinal:  Negative for rectal pain and vomiting.   Endocrine: Negative for cold intolerance and heat intolerance.   Genitourinary:  Negative for enuresis and flank pain.   Musculoskeletal:  Negative for myalgias and neck pain.   Skin:  Negative for color change and wound.   Allergic/Immunologic: Negative for environmental allergies and food allergies.   Neurological:  Negative for tremors and syncope.   Hematological:  Does not bruise/bleed easily.   Psychiatric/Behavioral:  Negative for decreased concentration and dysphoric mood.      PAST HISTORY:     No past medical history on file.    No past surgical history on file.    No family history on file.    Social History     Socioeconomic History    Marital status: Significant Other       MEDICATIONS & ALLERGIES:     Current Outpatient Medications on File Prior to Visit   Medication Sig Dispense Refill    blood sugar diagnostic Strp use  "as directed to check blood sugar every morning after meal and every evening 100 each 3    blood-glucose meter Misc use as directed 1 each 0    chlorhexidine (PERIDEX) 0.12 % solution Use as directed 15 mLs in the mouth or throat 3 (three) times daily 473 mL 0    glucagon (GLUCAGON EMERGENCY KIT, HUMAN,) 1 mg SolR Inject 1 mg as directed as needed (Administer if blood glucose <50, if unconscious, if siezing). (Patient not taking: Reported on 5/3/2023) 1 each 11    insulin aspart U-100 (NOVOLOG) 100 unit/mL (3 mL) InPn pen Inject 6 Units into the skin 3 (three) times daily before meals. 15 mL 11    insulin detemir U-100, Levemir, 100 unit/mL (3 mL) SubQ InPn pen Inject 18 Units into the skin every evening. 15 mL 0    lancets 30 gauge Misc use as directed to check blood sugar every morning after meal and every evening 100 each 3    metroNIDAZOLE (FLAGYL) 500 MG tablet Take 1 tablet (500 mg total) by mouth every 12 (twelve) hours. for 14 days 28 tablet 0    pen needle, diabetic 32 gauge x 5/32" Ndle use as directed to inject insulin 100 each 2     No current facility-administered medications on file prior to visit.        Review of patient's allergies indicates:  No Known Allergies    OBJECTIVE:     Vital Signs:  Vitals:    05/15/23 1105   BP: 124/80   Pulse: 82   Resp: 14   Temp: 98 °F (36.7 °C)     Body mass index is 33.77 kg/m².     Physical Exam:  Physical Exam  Vitals reviewed.   Constitutional:       Appearance: He is well-developed. He is morbidly obese.   HENT:      Head: Normocephalic and atraumatic.   Eyes:      Pupils: Pupils are equal, round, and reactive to light.   Cardiovascular:      Rate and Rhythm: Normal rate.   Pulmonary:      Effort: Pulmonary effort is normal.      Breath sounds: Normal breath sounds.   Abdominal:      General: Bowel sounds are normal.      Palpations: Abdomen is soft.   Musculoskeletal:         General: Normal range of motion.      Cervical back: Normal range of motion and neck " supple.   Skin:     General: Skin is warm and dry.   Neurological:      Mental Status: He is alert. Mental status is at baseline.      GCS: GCS eye subscore is 4. GCS verbal subscore is 5. GCS motor subscore is 6.   Psychiatric:         Attention and Perception: Attention normal.         Mood and Affect: Mood normal.         Speech: Speech normal.     Laboratory  Lab Results   Component Value Date    WBC 6.90 05/02/2023    HGB 11.8 (L) 05/02/2023    HCT 36.6 (L) 05/02/2023    MCV 85 05/02/2023     05/02/2023     No results found for: INR, PROTIME  Lab Results   Component Value Date    HGBA1C 11.3 (H) 04/26/2023     No results for input(s): POCTGLUCOSE in the last 72 hours.      TRANSITION OF CARE:     [facility] Contact Note: 5/3/23    Family and/or Caretaker present at visit?  Yes.  Diagnostic tests reviewed/disposition: No diagnosic tests pending after this hospitalization.  Disease/illness education: Importance of compliance with all prescribed medication and treatments, COVID precautions/Social Distancing/Mask Use    Home health/community services discussion/referrals: Patient does not have home health established from hospital visit.  They do not need home health.  If needed, we will set up home health for the patient.   Establishment or re-establishment of referral orders for community resources: No other necessary community resources.   Discussion with other health care providers: No discussion with other health care providers necessary.     Transition of Care Visit:  I have reviewed and updated the history and problem list.  I have reconciled the medication list.  I have discussed the hospitalization and current medical issues, prognosis and plans with the patient/family.  I  spent more than 50% of time discussing the care with the patient/family.  Total Face-to-Face Encounter: 60 minutes.    Medications Reconciliation:   I have reconciled the patient's home medications and discharge medications  "with the patient/family. I have updated all changes.  Refer to After-Visit Medication List.    I have discussed discharge plans, follow-up instructions, future appointments, provider contact information, indicators to seek medical emergency treatment, and advisement to call with additional questions or concerns. Patient and caregiver verbalize understanding.    ASSESSMENT & PLAN:       1. Diabetic ketoacidosis without coma associated with other specified diabetes mellitus  Assessment & Plan:  --recording blood glucose twice a day; reported between 150 and 250  --compliant with insulin regimen  --follow-up appointments scheduled for hospital follow-up    Orders:  -     Ambulatory referral/consult to Outpatient Case Management    2. Peritonsillar abscess  Assessment & Plan:  --completed antibiotic regimen  --reports mild throat soreness  --follow-up appointment scheduled with [facility] the patient has adequate transportation to      3. Obesity (BMI 30.0-34.9)  Assessment & Plan:  The patient is morbidly obese. Education completed ~ 15 minutes. The patient is asked to make an attempt to improve diet and exercise patterns to aid in medical management of this problem.             Were controlled substances prescribed?  No    Instructions for the patient:    Scheduled Follow-up :  Future Appointments   Date Time Provider Department Center   5/16/2023  1:30 PM Becky Calle MD [facility]   5/18/2023  1:40 PM Aurea Diop MD [facility]       After Visit Medication List :     Medication List            Accurate as of May 11, 2023 11:59 PM. If you have any questions, ask your nurse or doctor.                CONTINUE taking these medications      BD ULTRA-FINE PARK PEN NEEDLE 32 gauge x 5/32" Ndle  Generic drug: pen needle, diabetic  use as directed to inject insulin     cephALEXin 500 MG capsule  Commonly known as: KEFLEX  Take 1 capsule (500 mg total) by mouth 4 (four) times daily. for 11 days   "   chlorhexidine 0.12 % solution  Commonly known as: PERIDEX  Use as directed 15 mLs in the mouth or throat 3 (three) times daily     GLUCAGON EMERGENCY KIT (HUMAN) 1 mg Solr  Generic drug: glucagon  Inject 1 mg as directed as needed (Administer if blood glucose <50, if unconscious, if siezing).     LEVEMIR FLEXPEN 100 unit/mL (3 mL) Inpn pen  Generic drug: insulin detemir U-100 (Levemir)  Inject 18 Units into the skin every evening.     metroNIDAZOLE 500 MG tablet  Commonly known as: FLAGYL  Take 1 tablet (500 mg total) by mouth every 12 (twelve) hours. for 14 days     NovoLOG FlexPen U-100 Insulin 100 unit/mL (3 mL) Inpn pen  Generic drug: insulin aspart U-100  Inject 6 Units into the skin 3 (three) times daily before meals.     TRUE METRIX GLUCOSE METER Misc  Generic drug: blood-glucose meter  use as directed     TRUE METRIX GLUCOSE TEST STRIP Strp  Generic drug: blood sugar diagnostic  use as directed to check blood sugar every morning after meal and every evening     TRUEPLUS LANCETS 30 gauge Misc  Generic drug: lancets  use as directed to check blood sugar every morning after meal and every evening              Signature: Frandy Bethea NP

## 2023-05-15 NOTE — ASSESSMENT & PLAN NOTE
The patient is morbidly obese. Education completed ~ 15 minutes. The patient is asked to make an attempt to improve diet and exercise patterns to aid in medical management of this problem.

## 2023-05-15 NOTE — ASSESSMENT & PLAN NOTE
--recording blood glucose twice a day; reported between 150 and 250  --compliant with insulin regimen  --follow-up appointments scheduled for hospital follow-up

## 2023-05-18 ENCOUNTER — TELEPHONE (OUTPATIENT)
Dept: OTOLARYNGOLOGY | Facility: CLINIC | Age: 33
End: 2023-05-18

## 2023-05-18 NOTE — TELEPHONE ENCOUNTER
Attempted to return call. No answer, voicemail was left.     ----- Message from Livan Campoverde sent at 5/18/2023  1:35 PM CDT -----  .Type:  Needs Medical Advice    Who Called: pt    Would the patient rather a call back or a response via [facility]? Call back  Best Call Back Number: [phone]  Additional Information:     Pt would like a call back to reschedule his appointment for today because he is still at work

## 2023-05-23 ENCOUNTER — PATIENT OUTREACH (OUTPATIENT)
Dept: ADMINISTRATIVE | Facility: OTHER | Age: 33
End: 2023-05-23
Payer: MEDICAID

## 2023-05-23 NOTE — PROGRESS NOTES
CHW - Initial Contact    This Community Health Worker completed the Social Determinant of Health questionnaire with patient via telephone today.    Mr Henry states that he has trouble paying his utilities at times and he also has trouble sleeping at night. Pt was provided with [employer] application pickup location and a few tips for bedtime. Pt states that he will update CHW when he goes in to pickup application and is open to outreach if nothing is heard back.   Referrals to community agencies completed with patient/caregiver consent outside of [facility] include: [facility]  Referrals were put through [facility] - no  Follow up required: Y  Follow-up Outreach - Due: 6/6/2023

## 2023-05-25 ENCOUNTER — OFFICE VISIT (OUTPATIENT)
Dept: OTOLARYNGOLOGY | Facility: CLINIC | Age: 33
End: 2023-05-25
Payer: MEDICAID

## 2023-05-25 VITALS
DIASTOLIC BLOOD PRESSURE: 85 MMHG | BODY MASS INDEX: 35.67 KG/M2 | HEART RATE: 91 BPM | WEIGHT: 263 LBS | SYSTOLIC BLOOD PRESSURE: 137 MMHG | TEMPERATURE: 98 F

## 2023-05-25 DIAGNOSIS — E13.9 DIABETES 1.5, MANAGED AS TYPE 1: ICD-10-CM

## 2023-05-25 DIAGNOSIS — J36 TONSILLAR ABSCESS: ICD-10-CM

## 2023-05-25 PROCEDURE — 3008F PR BODY MASS INDEX (BMI) DOCUMENTED: ICD-10-PCS | Mod: CPTII,,, | Performed by: OTOLARYNGOLOGY

## 2023-05-25 PROCEDURE — 1111F DSCHRG MED/CURRENT MED MERGE: CPT | Mod: CPTII,,, | Performed by: OTOLARYNGOLOGY

## 2023-05-25 PROCEDURE — 1160F RVW MEDS BY RX/DR IN RCRD: CPT | Mod: CPTII,,, | Performed by: OTOLARYNGOLOGY

## 2023-05-25 PROCEDURE — 3046F HEMOGLOBIN A1C LEVEL >9.0%: CPT | Mod: CPTII,,, | Performed by: OTOLARYNGOLOGY

## 2023-05-25 PROCEDURE — 99212 PR OFFICE/OUTPT VISIT, EST, LEVL II, 10-19 MIN: ICD-10-PCS | Mod: S$PBB,,, | Performed by: OTOLARYNGOLOGY

## 2023-05-25 PROCEDURE — 3079F DIAST BP 80-89 MM HG: CPT | Mod: CPTII,,, | Performed by: OTOLARYNGOLOGY

## 2023-05-25 PROCEDURE — 3075F PR MOST RECENT SYSTOLIC BLOOD PRESS GE 130-139MM HG: ICD-10-PCS | Mod: CPTII,,, | Performed by: OTOLARYNGOLOGY

## 2023-05-25 PROCEDURE — 99213 OFFICE O/P EST LOW 20 MIN: CPT | Mod: PBBFAC,PN | Performed by: OTOLARYNGOLOGY

## 2023-05-25 PROCEDURE — 3008F BODY MASS INDEX DOCD: CPT | Mod: CPTII,,, | Performed by: OTOLARYNGOLOGY

## 2023-05-25 PROCEDURE — 99999 PR PBB SHADOW E&M-EST. PATIENT-LVL III: ICD-10-PCS | Mod: PBBFAC,,, | Performed by: OTOLARYNGOLOGY

## 2023-05-25 PROCEDURE — 1159F PR MEDICATION LIST DOCUMENTED IN MEDICAL RECORD: ICD-10-PCS | Mod: CPTII,,, | Performed by: OTOLARYNGOLOGY

## 2023-05-25 PROCEDURE — 99999 PR PBB SHADOW E&M-EST. PATIENT-LVL III: CPT | Mod: PBBFAC,,, | Performed by: OTOLARYNGOLOGY

## 2023-05-25 PROCEDURE — 1111F PR DISCHARGE MEDS RECONCILED W/ CURRENT OUTPATIENT MED LIST: ICD-10-PCS | Mod: CPTII,,, | Performed by: OTOLARYNGOLOGY

## 2023-05-25 PROCEDURE — 3075F SYST BP GE 130 - 139MM HG: CPT | Mod: CPTII,,, | Performed by: OTOLARYNGOLOGY

## 2023-05-25 PROCEDURE — 3079F PR MOST RECENT DIASTOLIC BLOOD PRESSURE 80-89 MM HG: ICD-10-PCS | Mod: CPTII,,, | Performed by: OTOLARYNGOLOGY

## 2023-05-25 PROCEDURE — 99212 OFFICE O/P EST SF 10 MIN: CPT | Mod: S$PBB,,, | Performed by: OTOLARYNGOLOGY

## 2023-05-25 PROCEDURE — 1159F MED LIST DOCD IN RCRD: CPT | Mod: CPTII,,, | Performed by: OTOLARYNGOLOGY

## 2023-05-25 PROCEDURE — 1160F PR REVIEW ALL MEDS BY PRESCRIBER/CLIN PHARMACIST DOCUMENTED: ICD-10-PCS | Mod: CPTII,,, | Performed by: OTOLARYNGOLOGY

## 2023-05-25 PROCEDURE — 3046F PR MOST RECENT HEMOGLOBIN A1C LEVEL > 9.0%: ICD-10-PCS | Mod: CPTII,,, | Performed by: OTOLARYNGOLOGY

## 2023-05-25 NOTE — PATIENT INSTRUCTIONS
Doing well overall.    No evidence of ongoing abnormalities of the tonsils or throat.      Follow up as needed.

## 2023-05-25 NOTE — PROGRESS NOTES
Chief Complaint   Patient presents with    Follow-up     Hospital follow up    Other     Tonsillar abscess        HPI: This is a 33-year-old  male who was seen as an inpatient with a peritonsillar abscess.  He was treated with incision and drainage and clinically improved during his hospital admission.  He presents today for follow-up visit.  He has not had any issues since completing his course of antibiotics after discharge home.              History reviewed. No pertinent past medical history.  Social History     Socioeconomic History    Marital status: Significant Other   Tobacco Use    Smoking status: Never    Smokeless tobacco: Never     Social Determinants of Health     Financial Resource Strain: Medium Risk    Difficulty of Paying Living Expenses: Somewhat hard   Food Insecurity: No Food Insecurity    Worried About Running Out of Food in the Last Year: Never true    Ran Out of Food in the Last Year: Never true   Transportation Needs: Unmet Transportation Needs    Lack of Transportation (Medical): Yes    Lack of Transportation (Non-Medical): No   Physical Activity: Sufficiently Active    Days of Exercise per Week: 4 days    Minutes of Exercise per Session: 60 min   Stress: Stress Concern Present    Feeling of Stress : To some extent   Social Connections: Socially Isolated    Frequency of Communication with Friends and Family: More than three times a week    Frequency of Social Gatherings with Friends and Family: More than three times a week    Attends Baptism Services: Never    Active Member of Clubs or Organizations: No    Attends Club or Organization Meetings: Never    Marital Status: Never    Housing Stability: High Risk    Unable to Pay for Housing in the Last Year: Yes    Number of Places Lived in the Last Year: 1    Unstable Housing in the Last Year: No     History reviewed. No pertinent surgical history.  History reviewed. No pertinent family history.        Review of  Systems  General: negative for chills, fever or weight loss  Psychological: negative for mood changes or depression  Ophthalmic: negative for blurry vision, photophobia or eye pain  ENT: see HPI  Respiratory: no cough, shortness of breath, or wheezing  Cardiovascular: no chest pain or dyspnea on exertion  Gastrointestinal: no abdominal pain, change in bowel habits, or black/ bloody stools  Musculoskeletal: negative for gait disturbance or muscular weakness  Neurological: no syncope or seizures; no ataxia  Dermatological: negative for pruritis,  rash and jaundice  Hematologic/lymphatic: no easy bruising, no new adenopathy      Physical Exam:    Vitals:    05/25/23 1318   BP: 137/85   Pulse: 91   Temp: 98.3 °F (36.8 °C)         Constitutional:   He is oriented to person, place, and time. Vital signs are normal. He appears well-developed and well-nourished. He appears alert. He is cooperative.  Non-toxic appearance. Normal speech.      Head:  Normocephalic and atraumatic. Salivary glands normal.  Facial strength is normal.      Ears:  Hearing normal to normal and whispered voice; external ear normal without scars, lesions, or masses; ear canal, tympanic membrane, and middle ear normal., right ear hearing normal to normal and whispered voice; external ear normal without scars, lesions, or masses; ear canal, tympanic membrane, and middle ear normal. and left ear hearing normal to normal and whispered voice; external ear normal without scars, lesions, or masses; ear canal, tympanic membrane, and middle ear normal..   Right Ear: Tympanic membrane is not perforated, not erythematous and not retracted. No middle ear effusion.   Left Ear: Tympanic membrane is not perforated, not erythematous and not retracted.  No middle ear effusion.     Nose:  Mucosal edema present. No rhinorrhea, septal deviation, nasal septal hematoma or polyps. No epistaxis. No turbinate masses and no turbinate hypertrophy (2+ size).  Right sinus  exhibits no maxillary sinus tenderness and no frontal sinus tenderness. Left sinus exhibits no maxillary sinus tenderness and no frontal sinus tenderness.     Mouth/Throat  Oropharynx clear and moist without lesions or asymmetry, normal uvula midline, lips, teeth, and gums normal and oropharynx normal. Normal dentition. No uvula swelling or oral lesions. No oropharyngeal exudate, posterior oropharyngeal edema, posterior oropharyngeal erythema or tonsillar abscesses. Tonsils present, +2.  Mirror exam not performed due to patient tolerance.  Mirror exam not performed due to patient tolerance.      Neck:  Neck normal without thyromegaly masses, asymmetry, normal tracheal structure, crepitus, and tenderness, thyroid normal, trachea normal, full range of motion with neck supple and no adenopathy. No JVD present. Carotid bruit is not present. Thyroid tenderness is present. No edema and no erythema present. No thyroid mass and no thyromegaly present.     He has no cervical adenopathy.     Cardiovascular:    Normal rate, regular rhythm, normal heart sounds and rate and rhythm, heart sounds, and pulses normal.              Pulmonary/Chest:   Effort and breath sounds normal.     Psychiatric:   He has a normal mood and affect. His speech is normal and behavior is normal.     Neurological:   He is alert and oriented to person, place, and time. He has neurological normal, alert and oriented. No cranial nerve deficit.     Skin:   No abrasions, lacerations, lesions, or rashes.             Assessment:    ICD-10-CM ICD-9-CM    1. 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      Plan:    Patient with streptococcal pharyngitis/tonsillitis and left peritonsillar abscess status post incision and drainage and hospital admission  for infection and diabetic control.    Abscess and infection resolved.    Follow-up p.r.n..    Aurea Diop MD

## 2023-05-31 ENCOUNTER — HOSPITAL ENCOUNTER (EMERGENCY)
Facility: HOSPITAL | Age: 33
Discharge: HOME | End: 2023-05-31
Attending: STUDENT IN AN ORGANIZED HEALTH CARE EDUCATION/TRAINING PROGRAM
Payer: MEDICAID

## 2023-05-31 VITALS
OXYGEN SATURATION: 100 % | TEMPERATURE: 99 F | SYSTOLIC BLOOD PRESSURE: 151 MMHG | RESPIRATION RATE: 19 BRPM | BODY MASS INDEX: 35.67 KG/M2 | HEART RATE: 107 BPM | WEIGHT: 263 LBS | DIASTOLIC BLOOD PRESSURE: 90 MMHG

## 2023-05-31 DIAGNOSIS — J02.0 STREP PHARYNGITIS: Primary | ICD-10-CM

## 2023-05-31 LAB
GROUP A STREP, MOLECULAR: POSITIVE
POCT GLUCOSE: 238 MG/DL (ref 70–110)

## 2023-05-31 PROCEDURE — 82962 GLUCOSE BLOOD TEST: CPT

## 2023-05-31 PROCEDURE — 99283 EMERGENCY DEPT VISIT LOW MDM: CPT

## 2023-05-31 PROCEDURE — 25000003 PHARM REV CODE 250: Performed by: STUDENT IN AN ORGANIZED HEALTH CARE EDUCATION/TRAINING PROGRAM

## 2023-05-31 PROCEDURE — 87651 STREP A DNA AMP PROBE: CPT | Performed by: STUDENT IN AN ORGANIZED HEALTH CARE EDUCATION/TRAINING PROGRAM

## 2023-05-31 RX ORDER — AMOXICILLIN AND CLAVULANATE POTASSIUM 875; 125 MG/1; MG/1
1 TABLET, FILM COATED ORAL 2 TIMES DAILY
Qty: 14 TABLET | Refills: 0 | Status: SHIPPED | OUTPATIENT
Start: 2023-05-31 | End: 2023-06-10

## 2023-05-31 RX ORDER — ACETAMINOPHEN 500 MG
1000 TABLET ORAL
Status: COMPLETED | OUTPATIENT
Start: 2023-05-31 | End: 2023-05-31

## 2023-05-31 RX ADMIN — ACETAMINOPHEN 1000 MG: 500 TABLET ORAL at 11:05

## 2023-06-01 ENCOUNTER — TELEPHONE (OUTPATIENT)
Dept: OTOLARYNGOLOGY | Facility: CLINIC | Age: 33
End: 2023-06-01
Payer: MEDICAID

## 2023-06-01 NOTE — DISCHARGE INSTRUCTIONS
Thank you for coming to our Emergency Department today. It is important to remember that some problems are difficult to diagnose and may not be found during your first visit. Be sure to follow up with your primary care doctor and review any labs/imaging that was performed with them. If you do not have a primary care doctor, you may contact the one listed on your discharge paperwork or you may also call the [facility] Appointment Desk at [phone] to schedule an appointment with one.     All medications may potentially have side effects and it is impossible to predict which medications may give you side effects. If you feel that you are having a negative effect of any medication you should immediately stop taking them and seek medical attention.    Return to the ER with any questions/concerns, new/concerning symptoms, worsening or failure to improve. Do not drive or make any important decisions for 24 hours if you have received any pain medications, sedatives or mood altering drugs during your ER visit.

## 2023-06-01 NOTE — ED PROVIDER NOTES
Encounter Date: 5/31/2023       History     Chief Complaint   Patient presents with    Sore Throat     Recent treated for a tonsillar abscess. Completed antibiotics 2 weeks ago. Started back with sore throat yesterday. Pain with swallowing. Denies fever.     33-year-old male with recently diagnosed diabetes, and recent tonsillar abscess presents with sore throat that began yesterday.  Patient reports he was admitted in the hospital and required a drainage of a tonsillar abscess approximately a month ago.  He reports he was discharged with antibiotics which he took and finished approximately 2 weeks ago.  Reports since yesterday he is had pain with swallowing that persisted throughout the day today so came to the emergency department for evaluation.  Denies any fevers or chills.  Denies any nausea or vomiting.  Denies any headache.  Denies any shortness or breath cough.       Review of patient's allergies indicates:  No Known Allergies  No past medical history on file.  No past surgical history on file.  No family history on file.  Social History     Tobacco Use    Smoking status: Never    Smokeless tobacco: Never     Review of Systems   Constitutional:  Negative for chills and fever.   HENT:  Positive for sore throat. Negative for congestion, drooling and rhinorrhea.    Eyes:  Negative for pain.   Respiratory:  Negative for cough and shortness of breath.    Cardiovascular:  Negative for chest pain and leg swelling.   Gastrointestinal:  Negative for abdominal pain, nausea and vomiting.   Endocrine: Negative for polyuria.   Genitourinary:  Negative for dysuria and hematuria.   Musculoskeletal:  Negative for gait problem and neck pain.   Skin:  Negative for rash.   Allergic/Immunologic: Negative for immunocompromised state.   Neurological:  Negative for weakness and headaches.     Physical Exam     Initial Vitals [05/31/23 1951]   BP Pulse Resp Temp SpO2   (!) 149/98 (!) 112 18 98.9 °F (37.2 °C) 99 %      MAP       --          Physical Exam    Constitutional: He appears well-developed and well-nourished. He is not diaphoretic. No distress.   HENT:   Head: Normocephalic and atraumatic.   Mouth/Throat: Mucous membranes are normal. Posterior oropharyngeal erythema present. No oropharyngeal exudate, posterior oropharyngeal edema or tonsillar abscesses.   Eyes: Conjunctivae and EOM are normal. Pupils are equal, round, and reactive to light.   Neck:   Normal range of motion.  Cardiovascular:  Regular rhythm.           Pulses:       Radial pulses are 2+ on the right side and 2+ on the left side.        Posterior tibial pulses are 2+ on the right side and 2+ on the left side.   Pulmonary/Chest: Breath sounds normal. No respiratory distress.   Abdominal: Abdomen is soft. Bowel sounds are normal. He exhibits no distension. There is no abdominal tenderness. There is no rebound and no guarding.   Musculoskeletal:         General: No tenderness. Normal range of motion.      Cervical back: Normal range of motion.     Lymphadenopathy:     He has no cervical adenopathy.   Neurological: He is alert and oriented to person, place, and time.   Skin: Skin is warm. Capillary refill takes less than 2 seconds.   Psychiatric: His behavior is normal.       ED Course   Procedures  Labs Reviewed   GROUP A STREP, MOLECULAR - Abnormal; Notable for the following components:       Result Value    Group A Strep, Molecular Positive (*)     All other components within normal limits   POCT GLUCOSE - Abnormal; Notable for the following components:    POCT Glucose 238 (*)     All other components within normal limits          Imaging Results    None          Medications   acetaminophen tablet 1,000 mg (1,000 mg Oral Given 5/31/23 5310)     Medical Decision Making:   History:   Old Medical Records: I decided to obtain old medical records.  Initial Assessment:   33-year-old male with recently diagnosed diabetes, and recent tonsillar abscess presents with sore throat that  began yesterday.  Patient in no acute distress, maintaining his secretions.  Exam notable for posterior pharyngeal erythema no tonsillar abscess appreciated.  No lymphadenopathy.    Patient overall well-appearing.  Rapid strep test came back positive. Hard to tell if patient is chronically colonized given recent diagnoses but will treat with antibiotics. No history of immunocompromise. Pt euvolemic w no trismus and no airway compromise. Able to tolerate PO. Unlikely PTA, RPA, Ludwigs, epiglottitis, acute HIV, or EBV due to HPI and physical exam. Nontoxic appearance.    Also considered but less likely:  Strep throat: No LAD, cough present, afebrile, no pharyngeal exudate  EBV/Mono: No prolonged course, no posterior LAD, no splenomegaly  HIV: No LAD, No GI upset, no skin rash, not sexually active, not IVDU  Peritonsillar abscess: No LAD, no hot potato voice, no uvular displacement, no redness or swelling in tonsillar area, afebrile  Retropharyngeal abscess: No neck pain, no dysphagia, No LAD, no croup like cough, afebrile  No obstructive processes such as obstructive goiter or ludwigs angina    Plan to DC home with Augmentin . Return precautions given, patient understands and agrees with plan. All questions answered.  Instructed to follow up with PCP.  Clinical Tests:   Lab Tests: Ordered and Reviewed           ED Course as of 06/01/23 0254   Wed May 31, 2023   7282 Group A Strep, Molecular(!) [AS]      ED Course User Index  [AS] Daisy Ham MD          DISCLAIMER: This note was prepared with [employer] voice recognition transcription software. Garbled syntax, mangled pronouns, and other bizarre constructions may be attributed to that software system.        Clinical Impression:   Final diagnoses:  [J02.0] Strep pharyngitis (Primary)        ED Disposition Condition    Discharge Stable          ED Prescriptions       Medication Sig Dispense Start Date End Date Auth. Provider    amoxicillin-clavulanate 875-125mg  (AUGMENTIN) 875-125 mg per tablet Take 1 tablet by mouth 2 (two) times daily. for 10 days 14 tablet 5/31/2023 6/10/2023 Daisy Ham MD          Follow-up Information       Follow up With Specialties Details Why Contact [facility] Emergency Dept Emergency Medicine  If symptoms worsen [address] [facility] [id number] [phone]    Ear nose and throat  Schedule an appointment as soon as possible for a visit  for reassesment     primary care doctor  Schedule an appointment as soon as possible for a visit  for reassesment              Daisy Ham MD  06/01/23 0254

## 2023-06-01 NOTE — TELEPHONE ENCOUNTER
Attempted to return call. No answer, left detailed voicemail with apt date/time.     ----- Message from Uri Steinberg sent at 6/1/2023  9:24 AM CDT -----  Type:  Sooner Apoointment Request    Caller is requesting a sooner appointment.  Caller will  accept being placed on the waitlist and is requesting a message be sent to doctor.  Name of Caller:pt Father   When is the first available appointment?none in epic  Symptoms:sore throat pt is unable to speak went to the Er last night   Would the patient rather a call back or a response via [facility]? call  Best Call Back Number:[phone]  Additional Information: please call would like to be seen ASAP

## 2023-06-01 NOTE — ED TRIAGE NOTES
Pt presents to ED with complaints of sore throat that began yesterday. Pt reports he had a tonsillar abscess recently that had to be cut and drained. Pt reports the pain feels similar as before. Pt spitting into cup, states he can swallow, but that it just hurts. Pain 10/10.    Patient identifiers for Jonh Figueroa verified by spelling and stated name on armband along with .     Review of patient's allergies indicates:  No Known Allergies     APPEARANCE: Alert, oriented and in no acute distress.  CARDIAC: Normal rate and rhythm.  PERIPHERAL VASCULAR: peripheral pulses present. Normal cap refill. No edema. Warm to touch.    RESPIRATORY:Normal rate and effort Respirations are equal and unlabored no obvious signs of distress.  GASTRO: soft, no tenderness, no abdominal distention.  MUSC: Full ROM. No bony tenderness or soft tissue tenderness. No obvious deformity.  SKIN: Skin is warm and dry, normal skin turgor, mucous membranes moist.  NEURO: 5/5 strength major flexors/extensors bilaterally. Sensory intact to light touch bilaterally. [place] coma scale: eyes open spontaneously-4, oriented & converses-5, obeys commands-6. No neurological abnormalities.   MENTAL STATUS: awake, alert and aware of environment.  EYE: PERRL, both eyes: pupils brisk and reactive to light. Normal size.  ENT: EARS: no obvious drainage. NOSE: no active bleeding. Redness noted to throat.         Patient verbalized understanding of status and plan of care. Patient changed into hospital gown with assistance.Patient side rails are up x 2, bed is low and locked, call light is in reach. Cardiac monitor (alarms on, set, and audible), pulse oximeter, and automatic blood pressure cuff applied.   Will continue to monitor.

## 2023-06-15 ENCOUNTER — PATIENT OUTREACH (OUTPATIENT)
Dept: ADMINISTRATIVE | Facility: OTHER | Age: 33
End: 2023-06-15
Payer: MEDICAID

## 2023-06-15 NOTE — PROGRESS NOTES
CHW - Follow Up    This Community Health Worker completed a follow up visit with patient via telephone today.  Pt/Caregiver reported: The target households that are more eligible for assistance were reiterated to him. There was also some concern around income guidelines but pt was near cusp of income window and was encouraged to apply if he is up for it.  Follow up required: Y  Follow-up Outreach - Due: 6/29/2023

## 2023-06-19 PROBLEM — E13.9 DIABETES 1.5, MANAGED AS TYPE 1: Status: ACTIVE | Noted: 2023-06-19

## 2023-06-19 PROBLEM — J02.9 SORE THROAT: Status: ACTIVE | Noted: 2023-06-19

## 2023-07-03 ENCOUNTER — PATIENT OUTREACH (OUTPATIENT)
Dept: ADMINISTRATIVE | Facility: OTHER | Age: 33
End: 2023-07-03
Payer: MEDICAID

## 2023-07-03 NOTE — PROGRESS NOTES
CHW - Case Closure    This Community Health Worker spoke to patient via telephone today.   Mr Figueroa decided not to move forward with assistance due to ineligibility.  Pt/Caregiver denied any additional needs at this time and agrees with episode closure at this time.  Provided patient with Community Health Worker's contact information and encouraged him/her to contact this Community Health Worker if additional needs arise.

## 2023-07-31 PROBLEM — E11.10 DKA (DIABETIC KETOACIDOSIS): Status: RESOLVED | Noted: 2023-04-26 | Resolved: 2023-07-31

## 2024-04-06 ENCOUNTER — HOSPITAL ENCOUNTER (EMERGENCY)
Facility: HOSPITAL | Age: 34
Discharge: HOME | End: 2024-04-06
Attending: STUDENT IN AN ORGANIZED HEALTH CARE EDUCATION/TRAINING PROGRAM
Payer: MEDICAID

## 2024-04-06 VITALS
HEIGHT: 72 IN | TEMPERATURE: 98 F | HEART RATE: 78 BPM | SYSTOLIC BLOOD PRESSURE: 145 MMHG | BODY MASS INDEX: 32.51 KG/M2 | DIASTOLIC BLOOD PRESSURE: 78 MMHG | OXYGEN SATURATION: 99 % | RESPIRATION RATE: 14 BRPM | WEIGHT: 240 LBS

## 2024-04-06 DIAGNOSIS — G47.00 INSOMNIA, UNSPECIFIED TYPE: Primary | ICD-10-CM

## 2024-04-06 LAB
BASOPHILS # BLD AUTO: 0.06 K/UL (ref 0–0.2)
BASOPHILS NFR BLD: 0.8 % (ref 0–1.9)
BUN SERPL-MCNC: 12 MG/DL (ref 6–30)
CHLORIDE SERPL-SCNC: 104 MMOL/L (ref 95–110)
CREAT SERPL-MCNC: 1.3 MG/DL (ref 0.5–1.4)
DIFFERENTIAL METHOD BLD: NORMAL
EOSINOPHIL # BLD AUTO: 0 K/UL (ref 0–0.5)
EOSINOPHIL NFR BLD: 0.5 % (ref 0–8)
ERYTHROCYTE [DISTWIDTH] IN BLOOD BY AUTOMATED COUNT: 13.8 % (ref 11.5–14.5)
ESTIMATED AVG GLUCOSE: 103 MG/DL (ref 68–131)
GLUCOSE SERPL-MCNC: 66 MG/DL (ref 70–110)
HBA1C MFR BLD: 5.2 % (ref 4–5.6)
HCT VFR BLD AUTO: 46.5 % (ref 40–54)
HCT VFR BLD CALC: 49 %PCV (ref 36–54)
HGB BLD-MCNC: 15.2 G/DL (ref 14–18)
IMM GRANULOCYTES # BLD AUTO: 0.04 K/UL (ref 0–0.04)
IMM GRANULOCYTES NFR BLD AUTO: 0.5 % (ref 0–0.5)
LYMPHOCYTES # BLD AUTO: 2.6 K/UL (ref 1–4.8)
LYMPHOCYTES NFR BLD: 34.5 % (ref 18–48)
MCH RBC QN AUTO: 28.5 PG (ref 27–31)
MCHC RBC AUTO-ENTMCNC: 32.7 G/DL (ref 32–36)
MCV RBC AUTO: 87 FL (ref 82–98)
MONOCYTES # BLD AUTO: 0.7 K/UL (ref 0.3–1)
MONOCYTES NFR BLD: 8.7 % (ref 4–15)
NEUTROPHILS # BLD AUTO: 4.1 K/UL (ref 1.8–7.7)
NEUTROPHILS NFR BLD: 55 % (ref 38–73)
NRBC BLD-RTO: 0 /100 WBC
PLATELET # BLD AUTO: 231 K/UL (ref 150–450)
PMV BLD AUTO: 10.8 FL (ref 9.2–12.9)
POC IONIZED CALCIUM: 1.09 MMOL/L (ref 1.06–1.42)
POC TCO2 (MEASURED): 20 MMOL/L (ref 23–29)
POTASSIUM BLD-SCNC: 3.5 MMOL/L (ref 3.5–5.1)
RBC # BLD AUTO: 5.34 M/UL (ref 4.6–6.2)
SAMPLE: ABNORMAL
SODIUM BLD-SCNC: 137 MMOL/L (ref 136–145)
WBC # BLD AUTO: 7.45 K/UL (ref 3.9–12.7)

## 2024-04-06 PROCEDURE — 80048 BASIC METABOLIC PNL TOTAL CA: CPT

## 2024-04-06 PROCEDURE — 85025 COMPLETE CBC W/AUTO DIFF WBC: CPT | Performed by: PHYSICIAN ASSISTANT

## 2024-04-06 PROCEDURE — 83036 HEMOGLOBIN GLYCOSYLATED A1C: CPT | Performed by: PHYSICIAN ASSISTANT

## 2024-04-06 PROCEDURE — 99283 EMERGENCY DEPT VISIT LOW MDM: CPT

## 2024-04-06 NOTE — ED PROVIDER NOTES
Encounter Date: 4/6/2024       History     Chief Complaint   Patient presents with    Insomnia     Denies suicidal /homicidal ideation, started during covid     The history is provided by the patient and medical records. No  was used.     Jonh Figueroa is a 34 y.o. male with medical history of Obesity, DM presenting to the ED with the chief complaint of insomnia.     Reports having difficulty sleeping for the past few days. Estimates only getting 1-2 hours per night. He has tried several OTC medications and his girlfriends trazodone which has helped in the past but no longer working. Requesting another medication to help him sleep. Denies being on any daily medications. DM is controlled through diet and exercise. He does not have a PCP currently. He does not recall his last A1c. Denies headache, fever, chest pain, SOB, abdominal pain, vomiting, urinary or bowel movement changes. He uses supplements for exercise but denies using any stimulants. He started testosterone injections a few months ago. No SI, HI, Hallucinations. Denies psychiatric history. Girlfriend at bedside during exam.     Review of patient's allergies indicates:  No Known Allergies  Past Medical History:   Diagnosis Date    Diabetes mellitus      History reviewed. No pertinent surgical history.  History reviewed. No pertinent family history.  Social History     Tobacco Use    Smoking status: Never    Smokeless tobacco: Never   Substance Use Topics    Alcohol use: Not Currently    Drug use: Yes     Review of Systems   Psychiatric/Behavioral:  Positive for sleep disturbance.        Physical Exam     Initial Vitals [04/06/24 1346]   BP Pulse Resp Temp SpO2   (!) 135/107 92 18 98.2 °F (36.8 °C) 100 %      MAP       --         Physical Exam    Constitutional: He appears well-developed and well-nourished. He is not diaphoretic. He is easily aroused.   HENT:   Head: Normocephalic and atraumatic.   Mouth/Throat: Oropharynx is clear and  moist. No oropharyngeal exudate.   Eyes: EOM and lids are normal. Pupils are equal, round, and reactive to light. No scleral icterus.   Neck: Phonation normal. Neck supple. No stridor present.   Normal range of motion.  Cardiovascular:  Normal rate and regular rhythm.           Pulmonary/Chest: Breath sounds normal. No stridor. No respiratory distress. He has no wheezes. He has no rales.   Abdominal: Abdomen is soft. He exhibits no distension. There is no abdominal tenderness. There is no rebound.   Musculoskeletal:         General: No tenderness or edema. Normal range of motion.      Cervical back: Normal range of motion and neck supple.     Neurological: He is alert, oriented to person, place, and time and easily aroused. He has normal strength. No sensory deficit.   Skin: Skin is warm and dry. No rash noted. No erythema.   Psychiatric: He has a normal mood and affect. His speech is normal and behavior is normal. Judgment normal. Thought content is not paranoid and not delusional. Cognition and memory are normal. He expresses no homicidal and no suicidal ideation. He expresses no suicidal plans and no homicidal plans.         ED Course   Procedures  Labs Reviewed   ISTAT PROCEDURE - Abnormal; Notable for the following components:       Result Value    POC Glucose 66 (*)     POC TCO2 (MEASURED) 20 (*)     All other components within normal limits   HEMOGLOBIN A1C   CBC W/ AUTO DIFFERENTIAL          Imaging Results    None          Medications - No data to display  Medical Decision Making  34 y.o. male with medical history of Obesity, DM presenting to the ED c/o difficulty sleeping over the past few days.     Denies stimulant or caffeine use. No daily medications. He is on testosterone injections which has 1-2% insomnia as side effect according to uptodate which could be etiology. Afebrile and non-toxic appearing. Appropriately conversational with linear thought process. No SI/HI/hallucinations. Do not feel he is  gravely disabled or manic. Also discussed possibility depression being a factor. Will check basic labs and A1c given DM history.     3:15 PM - Solo Chan PA-C  A1c normal. No emergent findings on laboratory findings. Okay for outpatient follow-up with PCP. Referrals provided for him to obtain an appointment. Patient expresses understanding and agreeable to the plan. Return to ED precautions given for new, worsening, or concerning symptoms.     Amount and/or Complexity of Data Reviewed  External Data Reviewed: labs and notes.  Labs: ordered. Decision-making details documented in ED Course.                                      Clinical Impression:  Final diagnoses:  [G47.00] Insomnia, unspecified type (Primary)          ED Disposition Condition    Discharge Stable          ED Prescriptions    None       Follow-up Information       Follow up With Specialties Details Why Contact Info Additional Information    [facility] Primary [facility] Internal Medicine   [address]  [facility] [id number] [phone] [facility] Please park in surface lot and check in at central registration [facility] [place] Surgical Oncology, Orthopedic Surgery, Genetics, Physical Medicine and Rehabilitation, Occupational Therapy, Radiology   [address] [facility] [address]  [phone]       [facility] SERVICES  Call   [address] [facility] [address]              Solo Chan PA-C  04/06/24 2025

## 2024-04-06 NOTE — DISCHARGE INSTRUCTIONS
Your difficulty sleeping could be a side effect of your testosterone therapy.     Follow-up with a primary care provider for further evaluation. You may use the below contact information to obtain an appointment. You should be considered for a sleep study program if you continue having sleep disturbances.     Return to the emergency room for new, worsening, or concerning symptoms.

## 2024-10-30 ENCOUNTER — PATIENT MESSAGE (OUTPATIENT)
Dept: RESEARCH | Facility: HOSPITAL | Age: 34
End: 2024-10-30
Payer: MEDICAID

## 2024-10-31 NOTE — ED NOTES
I-STAT Chem-8+ Results:   Value Reference Range   Sodium 137 136-145 mmol/L   Potassium  3.5 3.5-5.1 mmol/L   Chloride 104  mmol/L   Ionized Calcium 1.09 1.06-1.42 mmol/L   CO2 (measured) 20 23-29 mmol/L   Glucose 66  mg/dL   BUN 12 6-30 mg/dL   Creatinine 1.3 0.5-1.4 mg/dL   Hematocrit 49 36-54%       
LOC: The patient is awake, alert, and oriented to self, place, time, and situation. Pt is calm and cooperative. Affect is appropriate.  Speech is appropriate and clear.   States he hasn't slept in a week.    APPEARANCE: Patient resting comfortably in no acute distress.  Patient is clean and well groomed.    SKIN: The skin is warm and dry; color consistent with ethnicity.  Patient has normal skin turgor and moist mucus membranes.  Skin intact; no breakdown or bruising noted.     MUSCULOSKELETAL: Patient moving upper and lower extremities without difficulty; denies pain in the extremities or back.  Denies weakness.     RESPIRATORY: Airway is open and patent. Respirations spontaneous, even, easy, and non-labored.  Patient has a normal effort and rate.  No accessory muscle use noted. Denies cough.     CARDIAC.  No peripheral edema noted. No complaints of chest pain.     ABDOMEN: Soft and non tender to palpation.  No distention noted. Pt denies abdominal pain; denies nausea, vomiting, diarrhea, or constipation.    NEUROLOGIC: Eyes open spontaneously.  Behavior appropriate to situation.  Follows commands; facial expression symmetrical.  Purposeful motor response noted; normal sensation in all extremities. Pt denies headache; denies lightheadedness or dizziness; denies visual disturbances; denies loss of balance; denies unilateral weakness.      
Pt had vagal response when attempting the IV the first time. He became diaphoretic and faint, so he was laid back with feet up. Vtials taken and all remained within normal range. Provider aware  
Detail Level: Zone